# Patient Record
Sex: FEMALE | Race: ASIAN | Employment: FULL TIME | URBAN - METROPOLITAN AREA
[De-identification: names, ages, dates, MRNs, and addresses within clinical notes are randomized per-mention and may not be internally consistent; named-entity substitution may affect disease eponyms.]

---

## 2018-01-04 ENCOUNTER — APPOINTMENT (OUTPATIENT)
Dept: PHYSICAL THERAPY | Age: 53
End: 2018-01-04
Payer: COMMERCIAL

## 2018-01-11 ENCOUNTER — HOSPITAL ENCOUNTER (OUTPATIENT)
Dept: PHYSICAL THERAPY | Age: 53
Discharge: HOME | End: 2018-01-11
Payer: COMMERCIAL

## 2018-01-11 PROCEDURE — 97161 PT EVAL LOW COMPLEX 20 MIN: CPT

## 2018-01-11 PROCEDURE — 97140 MANUAL THERAPY 1/> REGIONS: CPT

## 2018-01-11 PROCEDURE — 97110 THERAPEUTIC EXERCISES: CPT

## 2018-01-11 NOTE — PROGRESS NOTES
PHYSICAL THERAPY - DAILY TREATMENT NOTE    Patient Name: John Duty        Date: 2018  : 1965   YES Patient  Verified  Visit #:     Insurance: Payor: Saleem Montana / Plan: Blanca CABRAL / Product Type: Commerical /      In time: 7:20 A Out time: 8:15 A   Total Treatment Time: 55     Medicare Time Tracking (below)   Total Timed Codes (min):  NA 1:1 Treatment Time:  NA     TREATMENT AREA =  Bunion, right foot [M21.611]    SUBJECTIVE  Pain Level (on 0 to 10 scale):  2  / 10   Medication Changes/New allergies or changes in medical history, any new surgeries or procedures?     NO    If yes, update Summary List   Subjective Functional Status/Changes:  []  No changes reported     See POC          OBJECTIVE  Modalities Rationale:     decrease edema and decrease pain to improve patient's ability to   Return to pain-free amb   min [] Estim, type/location:                                      []  att     []  unatt     []  w/US     []  w/ice    []  w/heat    min []  Mechanical Traction: type/lbs                   []  pro   []  sup   []  int   []  cont    []  before manual    []  after manual    min []  Ultrasound, settings/location:      min []  Iontophoresis w/ dexamethasone, location:                                               []  take home patch       []  in clinic   10 min [x]  Ice     []  Heat    location/position: longsit to R ankle    min []  Vasopneumatic Device, press/temp:     min []  Other:    [] Skin assessment post-treatment (if applicable):    []  intact    []  redness- no adverse reaction     []redness - adverse reaction:        10 min Therapeutic Exercise:  [x]  See flow sheet   Rationale:      increase ROM to improve the patients ability to return to PLOF     10 min Manual Therapy: 1st MTP ext/flex passive stretch with distraction   Rationale:     Improve ROM of 1st MTP to improve gait mechanics with ambulation      min Therapeutic Activity:    Rationale:         min Neuromuscular Re-ed:    Rationale:       min Gait Training:    Rationale:       min Patient Education:  YES  Reviewed HEP   []  Progressed/Changed HEP based on: Other Objective/Functional Measures:    See POC     Post Treatment Pain Level (on 0 to 10) scale:   2  / 10     ASSESSMENT  Assessment/Changes in Function:     See POC     []  See Progress Note/Recertification   Patient will continue to benefit from skilled PT services to instruct in home and community integration to attain remaining goals.    Progress toward goals / Updated goals:    Progressing towards goals established at [address] [facility] [id number]  [x]  Upgrade activities as tolerated YES Continue plan of care   []  Discharge due to :    []  Other:      Therapist: Keyla Baig PT    Date: 1/11/2018 Time: 9:12 AM     Future Appointments  Date Time Provider Ruslan Kang   1/18/2018 7:30 AM Keyla Baig PT [facility] [place]

## 2018-01-11 NOTE — PROGRESS NOTES
[facility] AT [address] [place] [address] Sheeba Plass [address], [address] [place] [address], [address]  Phone: [phone]  Fax: [phone] / [address] [place] [address] PHYSICAL THERAPY SERVICES  Patient Name: Thalia Michelle :    Medical   Diagnosis: Bunion, right foot [M21.611] Treatment Diagnosis: R hallux valgus    Onset Date: 17     Referral Source: Mary Jo Gutierrez MD [facility]): 2018   Prior Hospitalization: See medical history Provider #: [id number]   Prior Level of Function: Manageable sx with ADLs   Comorbidities: H/o bunion on L   Medications: Verified on Patient Summary List   The Plan of Care and following information is based on the information from the initial evaluation.   ==================================================================================  Assessment / key information:  Patient is a 45 y.o. female who presents to [facility] s/p R bunionectomy, hallux valgus on 17. Patient reports she was NWBing on a scooter in a CAM boot x 3 weeks & then progressed to CAM boot WBing with crutches & progressed off of crutches after 6 weeks. She reports DC use of CAM boot ~ 2 weeks into a lace up hiking boot. She reports her pain is very minimal & intermittent in nature with worsening of sx with prolonged weightbearing & after periods of prolonged sitting at her desk (she RTW 17). Sx improve with use of compression garment, ice & elevation. Average reported pain level at 2/10, 8/10 at worst & 0/10 at best.  She has since has 2 sets of X-rays, most recent on 17 with good healing reported. Upon objective evaluation patient demonstrates R 1st MTP passive ext 40 deg, flex 5 deg, R ankle DF 10 deg, PF 50 deg, EV 10 deg, EV 25 deg. MMT: DF 5-/5, PF 2/5, EV 4-/5, IV 4/5. SLS on R at 6 secs, limited by pain.   Upon observation of gait mechanics, pt ambulates with foot flat gait pattern with decreased WBing on R LE. Patient can benefit from PT interventions to improve ROM, strength, gait mechanics & balance and decrease pain, to facilitate ADLs & overall functional status.   ==================================================================================  Eval Complexity: History MEDIUM  Complexity : 1-2 comorbidities / personal factors will impact the outcome/ POC ;  Examination  MEDIUM Complexity : 3 Standardized tests and measures addressing body structure, function, activity limitation and / or participation in recreation ; Presentation LOW Complexity : Stable, uncomplicated ;  Decision Making MEDIUM Complexity : FOTO score of 26-74; Overall Complexity LOW   Problem List: pain affecting function, decrease ROM, decrease strength, edema affecting function, impaired gait/ balance, decrease ADL/ functional abilitiies, decrease activity tolerance, decrease flexibility/ joint mobility, decrease transfer abilities and other FOTO 49   Treatment Plan may include any combination of the following: Therapeutic exercise, Therapeutic activities, Neuromuscular re-education, Physical agent/modality, Gait/balance training, Manual therapy, Aquatic therapy, Patient education, Self Care training, Functional mobility training, Home safety training, Stair training and Other: FOTO 49 points  Patient / Family readiness to learn indicated by: asking questions and trying to perform skills  Persons(s) to be included in education: patient (P)  Barriers to Learning/Limitations: None  Measures taken:    Patient Goal (s): \"increase mobility and return to normal walking\"   Patient self reported health status: excellent  Rehabilitation Potential: excellent   Short Term Goals: To be accomplished in  2  weeks:  1) Establish HEP to prevent further disability. 2) Patient will report decreased c/o pain to < or = 1/10 to facilitate return to pain-free ambulation with manageable sx in R ankle.   3) Improve R 1st MTP ext to > or = 50 deg so ROM is available for normal gait mechanics. 4) Improve FOTO score from 49 points to > or = 59 points indicating improved tolerance with ADLs in regards to return to unlimited ADLs.  Long Term Goals: To be accomplished in  4  weeks:  1) Improve FOTO score from 59 points to > or = 69 points indicating improved tolerance with ADLs in regards to R ankle. 2) Improve R 1st MTP ext > or = 60 deg so ROM is available for normal gait mechanics. 3) Patient will be able to maintain R SLS for 30 seconds indicating improved use of balance strategy for safety with ambulation in challenging environments. 4) Improve overall strength of R ankle to 5-/5 with no c/o pain upon MMT so strength is available for return to PLOF . Frequency / Duration:   Patient to be seen  2-3  times per week for 4  weeks:  Patient / Caregiver education and instruction: self care, activity modification, brace/ splint application and exercises  G-Codes (GP): NA  Therapist Signature: MOHAMUD Lara cert MDT Date: 7/13/8946   Certification Period: NA Time: 7:19 AM   ===========================================================================================  I certify that the above Physical Therapy Services are being furnished while the patient is under my care. I agree with the treatment plan and certify that this therapy is necessary. Physician Signature:        Date:       Time:     Please sign and return to [facility] or you may fax the signed copy to [phone]. Thank you.

## 2018-01-18 ENCOUNTER — APPOINTMENT (OUTPATIENT)
Dept: PHYSICAL THERAPY | Age: 53
End: 2018-01-18
Payer: COMMERCIAL

## 2018-01-25 ENCOUNTER — HOSPITAL ENCOUNTER (OUTPATIENT)
Dept: PHYSICAL THERAPY | Age: 53
Discharge: HOME | End: 2018-01-25
Payer: COMMERCIAL

## 2018-01-25 PROCEDURE — 97140 MANUAL THERAPY 1/> REGIONS: CPT

## 2018-01-25 PROCEDURE — 97110 THERAPEUTIC EXERCISES: CPT

## 2018-01-25 NOTE — PROGRESS NOTES
PHYSICAL THERAPY - DAILY TREATMENT NOTE    Patient Name: Mary Marcus        Date: 2018  : 1965   YES Patient  Verified  Visit #:     Insurance: Payor: Luz Goodwin / Plan: Arland Castleman RPN / Product Type: Commerical /      In time: 5:10 P Out time: 6:12 P   Total Treatment Time: 55     Medicare Time Tracking (below)   Total Timed Codes (min):  NA 1:1 Treatment Time:  NA     TREATMENT AREA =  Hallux valgus, right [M20.11]    SUBJECTIVE  Pain Level (on 0 to 10 scale):  0  / 10   Medication Changes/New allergies or changes in medical history, any new surgeries or procedures? NO    If yes, update Summary List   Subjective Functional Status/Changes:  []  No changes reported     Patient had f/u with MD on Tuesday & they took X-rays & everything looked good. She reports she \"feels her hardware\" at times, this is not painful but she is aware of it more. F/u with MD prn.          OBJECTIVE  Modalities Rationale:     decrease edema, decrease inflammation and decrease pain to improve patient's ability to return to pain-free standing    min [] Estim, type/location:                                      []  att     []  unatt     []  w/US     []  w/ice    []  w/heat    min []  Mechanical Traction: type/lbs                   []  pro   []  sup   []  int   []  cont    []  before manual    []  after manual    min []  Ultrasound, settings/location:      min []  Iontophoresis w/ dexamethasone, location:                                               []  take home patch       []  in clinic   5 min [x]  Ice     []  Heat    location/position: longsit to R ankle    min []  Vasopneumatic Device, press/temp:     min []  Other:    [] Skin assessment post-treatment (if applicable):    []  intact    []  redness- no adverse reaction     []redness - adverse reaction:        35 min Therapeutic Exercise:  [x]  See flow sheet   Rationale:      increase ROM and increase strength to improve the patients ability to return to pain-free standing      15 min Manual Therapy:    Rationale:      decrease pain and increase ROM to improve patient's ability to return to amb with normalized gait mechanics. min Therapeutic Activity:    Rationale:      min Neuromuscular Re-ed:    Rationale:        min Gait Training:    Rationale:       min Patient Education:  YES  Reviewed HEP   []  Progressed/Changed HEP based on: Other Objective/Functional Measures: Added TE per flow sheet      Post Treatment Pain Level (on 0 to 10) scale:   0  / 10     ASSESSMENT  Assessment/Changes in Function:   Good tolerance to initial program      []  See Progress Note/Recertification   Patient will continue to benefit from skilled PT services to modify and progress therapeutic interventions, address functional mobility deficits, address ROM deficits, address strength deficits, analyze and modify body mechanics/ergonomics, assess and modify postural abnormalities, address imbalance/dizziness and instruct in home and community integration to attain remaining goals.    Progress toward goals / Updated goals:    Progressing towards goals established at [place]  [x]  Upgrade activities as tolerated YES Continue plan of care   []  Discharge due to :    []  Other:      Therapist: Zandra Padron PT    Date: 1/25/2018 Time: 4:58 PM     Future Appointments  Date Time Provider Ruslan Kang   1/25/2018 5:00 PM Zandra Padron, [facility] [place]

## 2018-02-01 ENCOUNTER — HOSPITAL ENCOUNTER (OUTPATIENT)
Dept: PHYSICAL THERAPY | Age: 53
Discharge: HOME | End: 2018-02-01
Payer: COMMERCIAL

## 2018-02-01 PROCEDURE — 97140 MANUAL THERAPY 1/> REGIONS: CPT

## 2018-02-01 PROCEDURE — 97110 THERAPEUTIC EXERCISES: CPT

## 2018-02-01 NOTE — PROGRESS NOTES
PHYSICAL THERAPY - DAILY TREATMENT NOTE    Patient Name: Chanelle Lloyd        Date: 2018  : 1965   YES Patient  Verified  Visit #:   3   of   12  Insurance: Payor: Lex Perez / Plan: Aggie CABRAL / Product Type: Commerical /      In time: 5 P Out time: 6:15 P   Total Treatment Time: 70     Medicare Time Tracking (below)   Total Timed Codes (min):  NA 1:1 Treatment Time:  NA     TREATMENT AREA =  Hallux valgus, right [M20.11]    SUBJECTIVE  Pain Level (on 0 to 10 scale):  0  / 10   Medication Changes/New allergies or changes in medical history, any new surgeries or procedures? NO    If yes, update Summary List   Subjective Functional Status/Changes:  []  No changes reported     Patient reports that she is doing her HEP as directed, she can feel her hardware more now when she walks.            OBJECTIVE  Modalities Rationale:     decrease pain to improve patient's ability to return to pain-free amb   min [] Estim, type/location:                                      []  att     []  unatt     []  w/US     []  w/ice    []  w/heat    min []  Mechanical Traction: type/lbs                   []  pro   []  sup   []  int   []  cont    []  before manual    []  after manual    min []  Ultrasound, settings/location:      min []  Iontophoresis w/ dexamethasone, location:                                               []  take home patch       []  in clinic   10 min [x]  Ice     []  Heat    location/position: longsit to R ankle    min []  Vasopneumatic Device, press/temp:     min []  Other:    [] Skin assessment post-treatment (if applicable):    []  intact    []  redness- no adverse reaction     []redness - adverse reaction:        40 min Therapeutic Exercise:  [x]  See flow sheet   Rationale:      increase ROM and increase strength to improve the patients ability to return to PLOF     20 min Manual Therapy: Manual stretch to 1st & 2nd MTP into ext, AP/PA mobs to 1st & 2nd MTP   Rationale:      increase ROM to improve patient's ability to tolerate normalized gait      min Therapeutic Activity:    Rationale:      min Neuromuscular Re-ed:    Rationale:        min Gait Training:    Rationale:       min Patient Education:  YES  Reviewed HEP   []  Progressed/Changed HEP based on: Other Objective/Functional Measures: Added mini squats, HR/TR in standing, 1st MTP stretch with Tband      Post Treatment Pain Level (on 0 to 10) scale:   0  / 10     ASSESSMENT  Assessment/Changes in Function:   Good tolerance to today's progressions, pt reported improved ease with performance of TE after MT today vs last PT visit. []  See Progress Note/Recertification   Patient will continue to benefit from skilled PT services to modify and progress therapeutic interventions, address functional mobility deficits, address ROM deficits, address strength deficits, assess and modify postural abnormalities, address imbalance/dizziness and instruct in home and community integration to attain remaining goals.    Progress toward goals / Updated goals:    Progressing towards STG  3, 4, STG 1 & 2 met      PLAN  [x]  Upgrade activities as tolerated YES Continue plan of care   []  Discharge due to :    []  Other:      Therapist: Ada Martinez PT    Date: 2/1/2018 Time: 6:41 PM     Future Appointments  Date Time Provider Ruslan Kang   2/8/2018 5:00 PM Crystal [place] [facility]   2/15/2018 5:00 PM Ada Martinez PT [facility]

## 2018-02-08 ENCOUNTER — HOSPITAL ENCOUNTER (OUTPATIENT)
Dept: PHYSICAL THERAPY | Age: 53
Discharge: HOME | End: 2018-02-08
Payer: COMMERCIAL

## 2018-02-08 PROCEDURE — 97110 THERAPEUTIC EXERCISES: CPT

## 2018-02-08 PROCEDURE — 97140 MANUAL THERAPY 1/> REGIONS: CPT

## 2018-02-08 NOTE — PROGRESS NOTES
[facility] AT [address] [place] [address] [facility], [address] [place] [address], [address]  Phone: [phone]  Fax: [phone]  PROGRESS NOTE  Patient Name: Opal Cooper :    Treatment/Medical Diagnosis: Hallux valgus, right [M20.11]   Referral Source: Boris Chen MD     Date of Initial Visit: 17 Attended Visits: 4 Missed Visits: 0     SUMMARY OF TREATMENT  Therapeutic exercise including ROM, stretching, gentle strengthening, gait & balance training, postural ed, patient education, HEP instruction, MHP, CP, man. CURRENT STATUS  Patient reports intermittent c/o pain in R ankle with primary c/o \"tightness & soreness\" in R foot/ankle, she reports 5-10 mins walking tolerance although her gait mechanics have improved. Please see below for other improvements with PT. Goal/Measure of Progress Goal Met? 1.  Establish HEP to prevent further disability. Status at last Eval: NA Current Status: HEP established  yes   2. Patient will report decreased c/o pain to < or = 1/10 to facilitate return to pain-free ambulation with manageable sx in R ankle. Status at last Eval: Average 2/10  At worst 8/10  At best 0/10  Current Status: Average 2-3/10  At worst 4/10  At best 0/10 yes   3. Improve R 1st MTP ext to > or = 50 deg so ROM is available for normal gait mechanics. Status at last Eval: R 1st MTP ext 40 deg  1st flex 5 deg Current Status: 1st MTP ext 65 deg  1st flex 20 deg yes   4. Improve FOTO score from 49 points to > or = 59 points indicating improved tolerance with ADLs in regards to return to unlimited ADLs. Status at last Eval: 52 Current Status: TBA n/a     New Goals to be achieved in __3-4__  weeks:  1. Improve FOTO score from 49 points to > or = 59 points indicating improved tolerance with ADLs in regards to return to unlimited ADLs.    2.  Improve walking tolerance to > o r= 15-20 mins so pt able to resume recreational fitness program.   3.  Improve overall strength of R ankle to 5-/5 with no c/o pain upon MMT so strength is available for return to PLOF . 4. Patient will be able to maintain R SLS for 30 seconds indicating improved use of balance strategy for safety with ambulation in challenging environments. G-Codes (GP): NA  RECOMMENDATIONS  Patient to continue with PT for up to 3-4 more weeks in order to progress towards achieving all LTGs. If you have any questions/comments please contact us directly at [phone]. Thank you for allowing us to assist in the care of your patient. Therapist Signature: MOHAMUD Corey, cert MDT Date: 1/8/0946     Time: 5:10 PM   NOTE TO PHYSICIAN:  PLEASE COMPLETE THE ORDERS BELOW AND FAX TO   [facility]: [phone]. If you are unable to process this request in 24 hours please contact our office: [phone].    ___ I have read the above report and request that my patient continue as recommended.   ___ I have read the above report and request that my patient continue therapy with the following changes/special instructions:_________________________________________________________   ___ I have read the above report and request that my patient be discharged from therapy.      Physician Signature:        Date:       Time:

## 2018-02-08 NOTE — PROGRESS NOTES
PHYSICAL THERAPY - DAILY TREATMENT NOTE    Patient Name: Dane Officer        Date: 2018  : 1965   YES Patient  Verified  Visit #:     Insurance: Payor: Cornelia Soto / Plan: Tonia CABRAL / Product Type: Commerical /      In time: 5:00 P Out time: 6:00 P   Total Treatment Time: 55     Medicare Time Tracking (below)   Total Timed Codes (min):  NA 1:1 Treatment Time:  NA     TREATMENT AREA =  Hallux valgus, right [M20.11]    SUBJECTIVE  Pain Level (on 0 to 10 scale):  0  / 10   Medication Changes/New allergies or changes in medical history, any new surgeries or procedures?     NO    If yes, update Summary List   Subjective Functional Status/Changes:  []  No changes reported     See PN           OBJECTIVE  Modalities Rationale:     decrease pain to improve patient's ability to return to pain-free amb   min [] Estim, type/location:                                      []  att     []  unatt     []  w/US     []  w/ice    []  w/heat    min []  Mechanical Traction: type/lbs                   []  pro   []  sup   []  int   []  cont    []  before manual    []  after manual    min []  Ultrasound, settings/location:      min []  Iontophoresis w/ dexamethasone, location:                                               []  take home patch       []  in clinic   10 min [x]  Ice     []  Heat    location/position: R foot/ankle     min []  Vasopneumatic Device, press/temp:     min []  Other:    [] Skin assessment post-treatment (if applicable):    []  intact    []  redness- no adverse reaction     []redness - adverse reaction:        30 min Therapeutic Exercise:  [x]  See flow sheet   Rationale:      increase ROM, increase strength, improve balance and increase proprioception to improve the patients ability to return to prolonged standing     15 min Manual Therapy: Manual stretch to 1st & 2nd MTP into ext, AP/PA mobs to 1st & 2nd MTP   Rationale:      decrease pain and increase ROM to improve patient's ability to return to pain-free ambulation      min Therapeutic Activity:    Rationale:      min Neuromuscular Re-ed:    Rationale:        min Gait Training:    Rationale:       min Patient Education:  YES  Reviewed HEP   []  Progressed/Changed HEP based on: Other Objective/Functional Measures:  01st MTP flex/ext (see PN)     Post Treatment Pain Level (on 0 to 10) scale:   0  / 10     ASSESSMENT  Assessment/Changes in Function:   Good progress with ROM/strength & gait mechanics      []  See Progress Note/Recertification   Patient will continue to benefit from skilled PT services to modify and progress therapeutic interventions, address functional mobility deficits, address ROM deficits, address strength deficits, assess and modify postural abnormalities, address imbalance/dizziness and instruct in home and community integration to attain remaining goals.    Progress toward goals / Updated goals:  Met STG 1, 2, 3 progressing towards STG 4     PLAN  [x]  Upgrade activities as tolerated YES Continue plan of care   []  Discharge due to :    [x]  Other: PN to MD     Therapist: Alexandro Padgett PT    Date: 2/8/2018 Time: 5:09 PM     Future Appointments  Date Time Provider Ruslan Kang   2/15/2018 5:00 PM Alexandro Padgett PT [facility] [place]

## 2018-02-15 ENCOUNTER — HOSPITAL ENCOUNTER (OUTPATIENT)
Dept: PHYSICAL THERAPY | Age: 53
Discharge: HOME | End: 2018-02-15
Payer: COMMERCIAL

## 2018-02-15 PROCEDURE — 97140 MANUAL THERAPY 1/> REGIONS: CPT

## 2018-02-15 PROCEDURE — 97110 THERAPEUTIC EXERCISES: CPT

## 2018-02-15 NOTE — PROGRESS NOTES
PHYSICAL THERAPY - DAILY TREATMENT NOTE    Patient Name: Ken Clark        Date: 2/15/2018  : 1965   YES Patient  Verified  Visit #:      of   12  Insurance: Payor: Reese Pulido / Plan: Ritu Lay RPN / Product Type: Commerical /      In time: 5:05 P Out time: 6:08 P   Total Treatment Time: 60     Medicare Time Tracking (below)   Total Timed Codes (min):  NA 1:1 Treatment Time:  NA     TREATMENT AREA =  Hallux valgus, right [M20.11]    SUBJECTIVE  Pain Level (on 0 to 10 scale):  0  / 10   Medication Changes/New allergies or changes in medical history, any new surgeries or procedures?     NO    If yes, update Summary List   Subjective Functional Status/Changes:  []  No changes reported     Patient reports f/u with MD went well, he said that what she feels in her hardware is fairly normal.          OBJECTIVE  Modalities Rationale:     decrease edema and decrease pain to improve patient's ability to return to pain-free ambulation    min [] Estim, type/location:                                      []  att     []  unatt     []  w/US     []  w/ice    []  w/heat    min []  Mechanical Traction: type/lbs                   []  pro   []  sup   []  int   []  cont    []  before manual    []  after manual    min []  Ultrasound, settings/location:      min []  Iontophoresis w/ dexamethasone, location:                                               []  take home patch       []  in clinic   10 min [x]  Ice     []  Heat    location/position: Supine to R foot    min []  Vasopneumatic Device, press/temp:     min []  Other:    [] Skin assessment post-treatment (if applicable):    []  intact    []  redness- no adverse reaction     []redness - adverse reaction:        35 min Therapeutic Exercise:  [x]  See flow sheet   Rationale:      increase ROM and increase strength to improve the patients ability to return to PLOF     15 min Manual Therapy: Manual stretch to 1st & 2nd MTP into ext, AP/PA mobs to 1st & 2nd MTP   Rationale: decrease pain and increase ROM to improve patient's ability to return to pain-free ambulation      min Therapeutic Activity:    Rationale:      min Neuromuscular Re-ed:    Rationale:        min Gait Training:    Rationale:       min Patient Education:  YES  Reviewed HEP   []  Progressed/Changed HEP based on: Other Objective/Functional Measures:    Progressed SLS to blue disc   Post Treatment Pain Level (on 0 to 10) scale:   0  / 10     ASSESSMENT  Assessment/Changes in Function:   Mod sensitivity along plantar aspect of foot with SLS with WBing without shoe today    []  See Progress Note/Recertification   Patient will continue to benefit from skilled PT services to modify and progress therapeutic interventions, address functional mobility deficits, address ROM deficits, address strength deficits, address imbalance/dizziness and instruct in home and community integration to attain remaining goals.    Progress toward goals / Updated goals:    Progressing towards newly established LTGs     PLAN  [x]  Upgrade activities as tolerated YES Continue plan of care   []  Discharge due to :    []  Other:      Therapist: Anil Abernathy PT    Date: 2/15/2018 Time: 5:08 PM     Future Appointments  Date Time Provider Ruslan Kang   3/1/2018 5:00 PM Vito Light [facility]   3/8/2018 5:00 PM Anil Abernathy, PT [facility]   3/15/2018 5:00 PM Anil Abernathy PT [facility]   3/22/2018 5:00 PM Anil Abernathy PT [facility]

## 2018-02-20 ENCOUNTER — HOSPITAL ENCOUNTER (OUTPATIENT)
Dept: PHYSICAL THERAPY | Age: 53
Discharge: HOME | End: 2018-02-20
Payer: COMMERCIAL

## 2018-02-20 PROCEDURE — 97140 MANUAL THERAPY 1/> REGIONS: CPT

## 2018-02-20 PROCEDURE — 97110 THERAPEUTIC EXERCISES: CPT

## 2018-02-20 NOTE — PROGRESS NOTES
PHYSICAL THERAPY - DAILY TREATMENT NOTE    Patient Name: Ivan Canela        Date: 2018  : 1965   YES Patient  Verified  Visit #:     Insurance: Payor: Zhen Arguello / Plan: Starla Gilbert RPN / Product Type: Commerical /      In time: 5:10 P Out time: 6:20 P   Total Treatment Time: 65     Medicare Time Tracking (below)   Total Timed Codes (min):  NA 1:1 Treatment Time:  NA     TREATMENT AREA =  Hallux valgus, right [M20.11]    SUBJECTIVE  Pain Level (on 0 to 10 scale):  2  / 10   Medication Changes/New allergies or changes in medical history, any new surgeries or procedures? NO    If yes, update Summary List   Subjective Functional Status/Changes:  []  No changes reported     Patient reports she has been focusing on her walking & taking the time to make a full step.            OBJECTIVE  Modalities Rationale:     decrease pain to improve patient's ability to return to pain-free standing   min [] Estim, type/location:                                      []  att     []  unatt     []  w/US     []  w/ice    []  w/heat    min []  Mechanical Traction: type/lbs                   []  pro   []  sup   []  int   []  cont    []  before manual    []  after manual    min []  Ultrasound, settings/location:      min []  Iontophoresis w/ dexamethasone, location:                                               []  take home patch       []  in clinic   10 min [x]  Ice     []  Heat    location/position: Longsit to R ankle    min []  Vasopneumatic Device, press/temp:     min []  Other:    [] Skin assessment post-treatment (if applicable):    []  intact    []  redness- no adverse reaction     []redness - adverse reaction:        35 min Therapeutic Exercise:  [x]  See flow sheet   Rationale:      increase ROM and increase strength to improve the patients ability to return to indep amb     20 min Manual Therapy: Manual stretch to 1st & 2nd MTP into ext, AP/PA mobs to 1st & 2nd MTP   Rationale:      decrease pain, increase ROM, increase tissue extensibility and increase postural awareness to improve patient's ability to return to amb with normalized gait mechanics     min Therapeutic Activity:    Rationale:      min Neuromuscular Re-ed:    Rationale:        min Gait Training:    Rationale:       min Patient Education:  YES  Reviewed HEP   []  Progressed/Changed HEP based on: Other Objective/Functional Measures:    TE per flow sheet, added step ups on BOSU     Post Treatment Pain Level (on 0 to 10) scale:   0  / 10     ASSESSMENT  Assessment/Changes in Function:   Good tolerance to today's strength progressions      []  See Progress Note/Recertification   Patient will continue to benefit from skilled PT services to modify and progress therapeutic interventions, address functional mobility deficits, address ROM deficits, address strength deficits, analyze and cue movement patterns, analyze and modify body mechanics/ergonomics, assess and modify postural abnormalities and instruct in home and community integration to attain remaining goals.    Progress toward goals / Updated goals:    Progressing towards LTG 1, 2     PLAN  [x]  Upgrade activities as tolerated YES Continue plan of care   []  Discharge due to :    []  Other:      Therapist: Alexandro Padgett PT    Date: 2/20/2018 Time: 6:27 PM     Future Appointments  Date Time Provider Ruslan Kang   3/1/2018 5:00 PM Jeremy Tipton [facility]   3/8/2018 5:00 PM Alexandro Padgett PT [facility]   3/15/2018 5:00 PM Alexandro Padgett PT [facility]   3/22/2018 5:00 PM Alexandro Padgett PT [facility]

## 2018-02-21 ENCOUNTER — APPOINTMENT (OUTPATIENT)
Dept: PHYSICAL THERAPY | Age: 53
End: 2018-02-21
Payer: COMMERCIAL

## 2018-03-01 ENCOUNTER — HOSPITAL ENCOUNTER (OUTPATIENT)
Dept: PHYSICAL THERAPY | Age: 53
Discharge: HOME | End: 2018-03-01
Payer: COMMERCIAL

## 2018-03-01 PROCEDURE — 97110 THERAPEUTIC EXERCISES: CPT

## 2018-03-01 PROCEDURE — 97140 MANUAL THERAPY 1/> REGIONS: CPT

## 2018-03-01 NOTE — PROGRESS NOTES
PHYSICAL THERAPY - DAILY TREATMENT NOTE    Patient Name: Ml Salinas        Date: 3/1/2018  : 1965   YES Patient  Verified  Visit #:     Insurance: Payor: Terra Mir / Plan: Kelley Rosenthal RPN / Product Type: Commerical /      In time: 5 P Out time: 6:10 P   Total Treatment Time: 65     Medicare Time Tracking (below)   Total Timed Codes (min):  NA 1:1 Treatment Time:  NA     TREATMENT AREA =  Hallux valgus, right [M20.11]    SUBJECTIVE  Pain Level (on 0 to 10 scale):  0  / 10   Medication Changes/New allergies or changes in medical history, any new surgeries or procedures?     NO    If yes, update Summary List   Subjective Functional Status/Changes:  []  No changes reported     Patient reports that she still has sensitivity along the bottom of her second toe          OBJECTIVE  Modalities Rationale:     decrease edema and decrease pain to improve patient's ability to return to pain-free standing   min [] Estim, type/location:                                      []  att     []  unatt     []  w/US     []  w/ice    []  w/heat    min []  Mechanical Traction: type/lbs                   []  pro   []  sup   []  int   []  cont    []  before manual    []  after manual    min []  Ultrasound, settings/location:      min []  Iontophoresis w/ dexamethasone, location:                                               []  take home patch       []  in clinic   10 min [x]  Ice     []  Heat    location/position: Supine to R knee     min []  Vasopneumatic Device, press/temp:     min []  Other:    [] Skin assessment post-treatment (if applicable):    []  intact    []  redness- no adverse reaction     []redness - adverse reaction:        35 min Therapeutic Exercise:  [x]  See flow sheet   Rationale:      increase ROM, improve coordination, improve balance and increase proprioception to improve the patients ability to return to amb without signs of antalgia     20 min Manual Therapy: Manual stretch to 1st & 2nd MTP into ext, AP/PA mobs to 1st & 2nd MTP   Rationale:      decrease pain and increase ROM to improve patient's ability to return to amb with normalized gait pattern     min Therapeutic Activity:    Rationale:      min Neuromuscular Re-ed:    Rationale:        min Gait Training:    Rationale:       min Patient Education:  YES  Reviewed HEP   []  Progressed/Changed HEP based on: Other Objective/Functional Measures:    Progressed to B HR (lowering on R LE)     Post Treatment Pain Level (on 0 to 10) scale:   0  / 10     ASSESSMENT  Assessment/Changes in Function:   Good tolerance to progression of weighted 1st MTP ext     []  See Progress Note/Recertification   Patient will continue to benefit from skilled PT services to modify and progress therapeutic interventions, address functional mobility deficits, address ROM deficits, assess and modify postural abnormalities, address imbalance/dizziness and instruct in home and community integration to attain remaining goals.    Progress toward goals / Updated goals:    Progressing towards LTGs     PLAN  [x]  Upgrade activities as tolerated YES Continue plan of care   []  Discharge due to :    [x]  Other: Re-assess n/v     Therapist: Alexandro Padgett PT    Date: 3/1/2018 Time: 6:32 PM     Future Appointments  Date Time Provider Ruslan Kang   3/8/2018 5:00 PM Jeremy Tipton [facility]   3/15/2018 5:00 PM Alexandro Padgett PT [facility]   3/22/2018 5:00 PM Alexandro Padgett PT [facility]

## 2018-03-08 ENCOUNTER — HOSPITAL ENCOUNTER (OUTPATIENT)
Dept: PHYSICAL THERAPY | Age: 53
Discharge: HOME | End: 2018-03-08
Payer: COMMERCIAL

## 2018-03-08 PROCEDURE — 97140 MANUAL THERAPY 1/> REGIONS: CPT

## 2018-03-08 PROCEDURE — 97110 THERAPEUTIC EXERCISES: CPT

## 2018-03-08 NOTE — PROGRESS NOTES
[address] [place] [address] PHYSICAL THERAPY AT [address] [place] [address]  [address]on[address] Aly [address], [address] ,[address], [address]  Phone: [phone]  Fax: [phone] SUMMARY  Patient Name: Juanis Rodríguez :    Treatment/Medical Diagnosis: Hallux valgus, right [M20.11]   Referral Source: Janine Dennis MD     Date of Initial Visit: 18 Attended Visits: 8 Missed Visits: 0     SUMMARY OF TREATMENT  Therapeutic exercise including ROM, stretching, gentle strengthening, gait & balance training, postural ed, patient education, HEP instruction, manual therapy, CP. CURRENT STATUS  Mrs. Renato Lewis has made good progress with PT & generally reports no c/o pain in R ankle with primary c/o stiffness & tightness in R foot/ankle. Good passive mobility of 1st MTP with limited mobility of 2nd MTP with c/o pain with flex>ext passively as well as TTP along the dorsum of 2nd MTP. Good mechanics noted with ambulation with improved push off noted with 1st MTP during terminal stance prior to swing phase on R LE. She reports 65% improvement overall, is pleased with her progress & feels ready for DC to home program to maintain current level of function. Goal/Measure of Progress Goal Met? 1. Improve FOTO score from 49 points to > or = 59 points indicating improved tolerance with ADLs in regards to return to unlimited ADLs. Status at last Eval: 49 Current Status: 50 Partially met    2. Improve walking tolerance to > or= 15-20 mins so pt able to resume recreational fitness program.   Status at last Eval: limited Current Status: Pt able to tolerate WBing up to 1 hour on R LE yes   3. Improve overall strength of R ankle to 5-/5 with no c/o pain upon MMT so strength is available for return to PLOF . Status at last Eval: DF 5-/5, PF 2/5, EV 4-/5  IV 4/5 Current Status: DF 5/5,  PF 4/5, EV 5/5  IV 5/5 Partially met   4.   Patient will be able to maintain R SLS for 30 seconds indicating improved use of balance strategy for safety with ambulation in challenging environments.     Status at last Eval: unable Current Status: R SLS 30 sec on noncompliant surface yes     RECOMMENDATIONS  Discontinue therapy. Progressing towards or have reached established goals. If you have any questions/comments please contact us directly at [phone]. Thank you for allowing us to assist in the care of your patient.     Therapist Signature: MOHAMUD Spann, cert MDT Date: 9-86-83     Time: 6:37 PM

## 2018-03-08 NOTE — PROGRESS NOTES
PHYSICAL THERAPY - DAILY TREATMENT NOTE    Patient Name: Larissa Santo        Date: 3/8/2018  : 1965   YES Patient  Verified  Visit #:     Insurance: Payor: Benedicto Powell / Plan: Desiree Regalado RPN / Product Type: Commerical /      In time: 5:10 P Out time: 6:05 P   Total Treatment Time: 50     Medicare Time Tracking (below)   Total Timed Codes (min):  NA 1:1 Treatment Time:  NA     TREATMENT AREA =  Hallux valgus, right [M20.11]    SUBJECTIVE  Pain Level (on 0 to 10 scale):  0  / 10   Medication Changes/New allergies or changes in medical history, any new surgeries or procedures?     NO    If yes, update Summary List   Subjective Functional Status/Changes:  []  No changes reported     See DC summary           OBJECTIVE  Modalities Rationale:     decrease pain to improve patient's ability to return to pain-free amb   min [] Estim, type/location:                                      []  att     []  unatt     []  w/US     []  w/ice    []  w/heat    min []  Mechanical Traction: type/lbs                   []  pro   []  sup   []  int   []  cont    []  before manual    []  after manual    min []  Ultrasound, settings/location:      min []  Iontophoresis w/ dexamethasone, location:                                               []  take home patch       []  in clinic   10 min [x]  Ice     []  Heat    location/position: Supine to R ankle    min []  Vasopneumatic Device, press/temp:     min []  Other:    [] Skin assessment post-treatment (if applicable):    []  intact    []  redness- no adverse reaction     []redness - adverse reaction:        15 min Therapeutic Exercise:  [x]  See flow sheet   Rationale:      increase ROM and increase strength to improve the patients ability to return to pain-free standing     25 min Manual Therapy: Manual stretch to 1st & 2nd MTP into ext, AP/PA mobs to 1st & 2nd MTP   Rationale:     Improve mobility to normalize gait mechanics     min Therapeutic Activity:    Rationale: min Neuromuscular Re-ed:    Rationale:       min Gait Training:    Rationale:       min Patient Education:  YES  Reviewed HEP   []  Progressed/Changed HEP based on: Other Objective/Functional Measures:    FOTO 50  R ankle strength 5/5 no c/o pain upon MMT     Post Treatment Pain Level (on 0 to 10) scale:   0  / 10     ASSESSMENT  Assessment/Changes in Function:     Pt in agreement with DC, indep with program      []  See Progress Note/Recertification   Patient will continue to benefit from skilled PT services to instruct in home and community integration to attain remaining goals.    Progress toward goals / Updated goals:    Progressing towards LTGs     PLAN  []  Upgrade activities as tolerated NO Continue plan of care   [x]  Discharge due to : Goals partially met   []  Other:      Therapist: Marylu Murray, PT    Date: 3/8/2018 Time: 5:23 PM     Future Appointments  Date Time Provider Ruslan Kang   3/8/2018 5:30 PM Miguel Becerra [facility]   3/15/2018 5:00 PM Marylu Murray, PRABHA [facility]   3/22/2018 5:00 PM Marylu Murray PT [facility]

## 2018-03-15 ENCOUNTER — APPOINTMENT (OUTPATIENT)
Dept: PHYSICAL THERAPY | Age: 53
End: 2018-03-15
Payer: COMMERCIAL

## 2018-03-22 ENCOUNTER — APPOINTMENT (OUTPATIENT)
Dept: PHYSICAL THERAPY | Age: 53
End: 2018-03-22
Payer: COMMERCIAL

## 2019-03-04 ENCOUNTER — HOSPITAL ENCOUNTER (OUTPATIENT)
Dept: PHYSICAL THERAPY | Age: 54
Discharge: HOME | End: 2019-03-04
Payer: COMMERCIAL

## 2019-03-04 PROCEDURE — 97161 PT EVAL LOW COMPLEX 20 MIN: CPT

## 2019-03-04 PROCEDURE — 97110 THERAPEUTIC EXERCISES: CPT

## 2019-03-04 NOTE — PROGRESS NOTES
[facility] AT [address] [place] [address] [address], [address] [place] [address], [address]  Phone: [phone]  Fax: [phone] / [address] [address] PHYSICAL THERAPY SERVICES  Patient Name: Nicola Luciano :    Medical   Diagnosis: Pain in right shoulder [M25.511] Treatment Diagnosis: R shoulder pain   Onset Date: 2019     Referral Source: Sylvia Driver MD [facility] 3/4/2019   Prior Hospitalization: See medical history Provider #: [id number]   Prior Level of Function: Manage   Comorbidities: None   Medications: Verified on Patient Summary List   The Plan of Care and following information is based on the information from the initial evaluation.   ==================================================================================  Assessment / key information:  Patient is a pleasant 52 y.o. female who presents to [facility] with R shoulder pain. Patient reports chronic h/o neck pain & H/A which have improved although she reports initial onset of sx in R shoulder in Feb which were of unknown etiology. Current c/o pain are currently intermittent in nature & worsen with activities such as reaching overhead, reaching behind her to dress, reaching out to the side, & with lifting such as holding her purse. Sx improve with avoidance of these activities, rest & taking alleve prn. Average reported pain level at 3/10, 6/10 at worst & 0/10 at best.  She had an X-ray on 19 which was unremarkable & she also reports reduction of sx after steroid injection at f/u with MD. Upon objective evaluation, patient demonstrates R shoulder AROM that is grossly [place] [place] with mild ERP throughout all planes of motion as well as ERP with passive OP to ER today. MMT revealed overall strength at 4-/5 with c/o pain upon MMT of flex, ABD & ER>IR today.   Impingement tests were (+) for sx reproduction & mod TTP along SS tendon today. Patient can benefit PT interventions to improve posture & strength, decrease pain & restore mechanics of shoulder to facilitate return to unlimited ADLs, work activities & overall functional status.   ==================================================================================  Eval Complexity: History LOW Complexity : Zero comorbidities / personal factors that will impact the outcome / POC;  Examination  MEDIUM Complexity : 3 Standardized tests and measures addressing body structure, function, activity limitation and / or participation in recreation ; Presentation LOW Complexity : Stable, uncomplicated ;  Decision Making MEDIUM Complexity : FOTO score of 26-74; Overall Complexity LOW   Problem List: pain affecting function, decrease ROM, decrease strength, edema affecting function, decrease ADL/ functional abilitiies, decrease transfer abilities and other FOTO 41 points   Treatment Plan may include any combination of the following: Therapeutic exercise, Therapeutic activities, Neuromuscular re-education, Physical agent/modality, Gait/balance training, Manual therapy, Aquatic therapy, Patient education, Self Care training, Functional mobility training and Home safety training  Patient / Family readiness to learn indicated by: asking questions, trying to perform skills and interest  Persons(s) to be included in education: patient (P)  Barriers to Learning/Limitations: None  Measures taken:    Patient Goal (s): \"reduce, eliminate pain\"   Patient self reported health status: good  Rehabilitation Potential: good   Short Term Goals: To be accomplished in  2  weeks:  1) Establish HEP to prevent further disability. 2) Patient will report decreased c/o pain to < or = 3-4/10 at worst to facilitate return to overhead reaching with manageable sx in R shoulder. 3) Improve FOTO score from 41 points to > or = 50 points indicating improved tolerance with ADLs in regards to R shoulder.  Long Term Goals:  To be accomplished in  4  weeks:  1) Improve FOTO score from 50 points to > or = 66 points indicating improved tolerance with ADLs in regards to R shoulder. 2) Patient to report 75% improvement in overall function in preparation for return to recreational activities with manageable sx in R shoulder. 3) Improve overall strength of R shoulder to 5-/5 so strength is available for return to light lifting activities at work/home. 4) Improve R shoulder AROM for all planes of motion to [facility] so ROM is available for dressing activities and/or overhead reaching. Frequency / Duration:   Patient to be seen  2-3  times per week for 4  weeks:  Patient / Caregiver education and instruction: self care, activity modification, brace/ splint application and exercises    Therapist Signature: MOHAMUD Lubin, cert MDT Date: 5/3/5695   Certification Period: None Time: 9:18 AM   ==================================================================================  I certify that the above Physical Therapy Services are being furnished while the patient is under my care. I agree with the treatment plan and certify that this therapy is necessary. Physician Signature:        Date:       Time:     Please sign and return to [facility] or you may fax the signed copy to [phone]. Thank you.

## 2019-03-04 NOTE — PROGRESS NOTES
PHYSICAL THERAPY - DAILY TREATMENT NOTE    Patient Name: Sukhdeep Fuller        Date: 3/4/2019  : 1965   YES Patient  Verified  Visit #:      of   12  Insurance: Payor: Lilian Parrish / Plan: Aris Valle RPN / Product Type: Commerical /      In time: 9:15 A Out time: 10 A   Total Treatment Time: 45     BCBS/Medicare Time Tracking (below)   Total Timed Codes (min):  NA 1:1 Treatment Time:  NA     TREATMENT AREA = Pain in right shoulder [M25.511]    SUBJECTIVE  Pain Level (on 0 to 10 scale):  0  / 10   Medication Changes/New allergies or changes in medical history, any new surgeries or procedures?     NO    If yes, update Summary List   Subjective Functional Status/Changes:  []  No changes reported     See POC           Modalities Rationale:    PD   min [] Estim, type/location:                                      []  att     []  unatt     []  w/US     []  w/ice    []  w/heat    min []  Mechanical Traction: type/lbs                   []  pro   []  sup   []  int   []  cont    []  before manual    []  after manual    min []  Ultrasound, settings/location:      min []  Iontophoresis w/ dexamethasone, location:                                               []  take home patch       []  in clinic    min []  Ice     []  Heat    location/position:     min []  Vasopneumatic Device, press/temp:     min []  Other:    [] Skin assessment post-treatment (if applicable):    []  intact    []  redness- no adverse reaction     []redness - adverse reaction:        10 min Therapeutic Exercise:  [x]  See flow sheet   Rationale:      increase ROM and increase strength to improve the patients ability to return to pain-free dressing     Billed With/As:   [] TE   [] TA   [] Neuro   [] Self Care Patient Education: [x] Review HEP    [] Progressed/Changed HEP based on:   [] positioning   [] body mechanics   [] transfers   [] heat/ice application    [] other:      Other Objective/Functional Measures:    See POC     Post Treatment Pain Level (on 0 to 10) scale:   0  / 10     ASSESSMENT  Assessment/Changes in Function:     See POC     []  See Progress Note/Recertification   Patient will continue to benefit from skilled PT services to modify and progress therapeutic interventions, address functional mobility deficits, address ROM deficits, assess and modify postural abnormalities and instruct in home and community integration to attain remaining goals.    Progress toward goals / Updated goals:    Progressing towards goals established at [address] [facility] [address]  []  Upgrade activities as tolerated YES Continue plan of care   []  Discharge due to :    []  Other:      Therapist: Massimo Degroot PT    Date: 3/4/2019 Time: 3:42 PM     Future Appointments   Date Time Provider Ruslan Kang   3/12/2019  6:00 PM Loulou Schafer [facility] [place]   3/21/2019  8:30 AM Angella Robison, PT [facility] [place]   3/28/2019  8:30 AM Angella Robison, PT [facility] [place]   2019  8:30 AM Wolf Sheikh, PT [facility] [place]

## 2019-03-12 ENCOUNTER — HOSPITAL ENCOUNTER (OUTPATIENT)
Dept: PHYSICAL THERAPY | Age: 54
Discharge: HOME | End: 2019-03-12
Payer: COMMERCIAL

## 2019-03-12 PROCEDURE — 97110 THERAPEUTIC EXERCISES: CPT

## 2019-03-12 NOTE — PROGRESS NOTES
PHYSICAL THERAPY - DAILY TREATMENT NOTE    Patient Name: Celeste Hernandez        Date: 3/12/2019  : 1965   YES Patient  Verified  Visit #:     Insurance: Payor: [address] [place] [address] / Plan: Danae Nino RPN / Product Type: Commerical /      In time: 5:55 P Out time: 7 P   Total Treatment Time: 60     BCBS/Medicare Time Tracking (below)   Total Timed Codes (min):  NA 1:1 Treatment Time:  NA     TREATMENT AREA = Pain in right shoulder [M25.511]    SUBJECTIVE  Pain Level (on 0 to 10 scale):  0  / 10   Medication Changes/New allergies or changes in medical history, any new surgeries or procedures? NO    If yes, update Summary List   Subjective Functional Status/Changes:  []  No changes reported     Patient reports that her arm has been feeling better since doing the stretches.             Modalities Rationale:     decrease pain to improve patient's ability to return to pain-free use of R shoulder with lifting    min [] Estim, type/location:                                      []  att     []  unatt     []  w/US     []  w/ice    []  w/heat    min []  Mechanical Traction: type/lbs                   []  pro   []  sup   []  int   []  cont    []  before manual    []  after manual    min []  Ultrasound, settings/location:      min []  Iontophoresis w/ dexamethasone, location:                                               []  take home patch       []  in clinic   10 min [x]  Ice     []  Heat    location/position: Supine to R shoulder    min []  Vasopneumatic Device, press/temp:     min []  Other:    [] Skin assessment post-treatment (if applicable):    []  intact    []  redness- no adverse reaction     []redness - adverse reaction:        50 min Therapeutic Exercise:  [x]  See flow sheet   Rationale:      increase ROM to improve the patients ability to return to pain-free lifting      Billed With/As:   [] TE   [] TA   [] Neuro   [] Self Care Patient Education: [x] Review HEP    [] Progressed/Changed HEP based on:   [] positioning   [] body mechanics   [] transfers   [] heat/ice application    [] other:       Other Objective/Functional Measures:    See flow sheet     Post Treatment Pain Level (on 0 to 10) scale:   0  / 10     ASSESSMENT  Assessment/Changes in Function:     Good tolerance to today's treatment, no increase in pain, improved tolerance to resisted ER today      []  See Progress Note/Recertification   Patient will continue to benefit from skilled PT services to modify and progress therapeutic interventions, address functional mobility deficits, address ROM deficits, address strength deficits, assess and modify postural abnormalities and instruct in home and community integration to attain remaining goals.    Progress toward goals / Updated goals:    Progressing towards goals established at [place] [place] Amandeep Madie  []  Upgrade activities as tolerated YES Continue plan of care   []  Discharge due to :    []  Other:      Therapist: Arnaud Daly, PT    Date: 3/12/2019 Time: 7:06 PM     Future Appointments   Date Time Provider Ruslan Kang   3/21/2019  5:30 PM Jame Marte [facility] [place]   3/28/2019  4:30 PM Caryle Rater, PT [facility] [place]   4/4/2019  8:30 AM Isabel Sheikh, PT [facility] [place]

## 2019-03-21 ENCOUNTER — APPOINTMENT (OUTPATIENT)
Dept: PHYSICAL THERAPY | Age: 54
End: 2019-03-21
Payer: COMMERCIAL

## 2019-03-26 ENCOUNTER — APPOINTMENT (OUTPATIENT)
Dept: PHYSICAL THERAPY | Age: 54
End: 2019-03-26
Payer: COMMERCIAL

## 2019-03-28 ENCOUNTER — HOSPITAL ENCOUNTER (OUTPATIENT)
Dept: PHYSICAL THERAPY | Age: 54
Discharge: HOME | End: 2019-03-28
Payer: COMMERCIAL

## 2019-03-28 PROCEDURE — 97110 THERAPEUTIC EXERCISES: CPT

## 2019-03-28 NOTE — PROGRESS NOTES
PHYSICAL THERAPY - DAILY TREATMENT NOTE    Patient Name: Piotr Oliva        Date: 3/28/2019  : 1965   YES Patient  Verified  Visit #:   3   of   12  Insurance: Payor: [address] [place] [address] / Plan: Leonor Whittington RPN / Product Type: Commerical /      In time: 3:55 P Out time: 4:45 P   Total Treatment Time: 50     BCBS/Medicare Time Tracking (below)   Total Timed Codes (min):  NA 1:1 Treatment Time:  NA     TREATMENT AREA = Pain in right shoulder [M25.511]    SUBJECTIVE  Pain Level (on 0 to 10 scale):  0  / 10   Medication Changes/New allergies or changes in medical history, any new surgeries or procedures? NO    If yes, update Summary List   Subjective Functional Status/Changes:  []  No changes reported     Patient reports she had some pain in her arm  that woke her up, her arm is feeling better overall, but her progress is slow, she will have an MRI on her shoulder next Friday,  19.         Modalities Rationale:     decrease pain to improve patient's ability to return to pain-free use of R shoulder   min [] Estim, type/location:                                      []  att     []  unatt     []  w/US     []  w/ice    []  w/heat    min []  Mechanical Traction: type/lbs                   []  pro   []  sup   []  int   []  cont    []  before manual    []  after manual    min []  Ultrasound, settings/location:      min []  Iontophoresis w/ dexamethasone, location:                                               []  take home patch       []  in clinic   10 min [x]  Ice     []  Heat    location/position: Supine to R shoulder     min []  Vasopneumatic Device, press/temp:     min []  Other:    [] Skin assessment post-treatment (if applicable):    []  intact    []  redness- no adverse reaction     []redness - adverse reaction:        40 min Therapeutic Exercise:  [x]  See flow sheet   Rationale:      increase ROM to improve the patients ability to return to reaching overhead     Billed With/As:   [] TE   [] TA   [] Neuro   [] Self Care Patient Education: [x] Review HEP    [] Progressed/Changed HEP based on:   [] positioning   [] body mechanics   [] transfers   [] heat/ice application    [] other:      Other Objective/Functional Measures: Added prone horiz ABD with 0#     Post Treatment Pain Level (on 0 to 10) scale:   0  / 10     ASSESSMENT  Assessment/Changes in Function:     Pt challenged by prone scapular strengthening exercise today, limited by weakness, no increase in pain      []  See Progress Note/Recertification   Patient will continue to benefit from skilled PT services to modify and progress therapeutic interventions, address functional mobility deficits, address ROM deficits, address strength deficits, assess and modify postural abnormalities and instruct in home and community integration to attain remaining goals.    Progress toward goals / Updated goals:    Progressing towards STG 2, 3     PLAN  [x]  Upgrade activities as tolerated YES Continue plan of care   []  Discharge due to :    []  Other:      Therapist: Le Padilla, PT    Date: 3/28/2019 Time: 5:36 PM     Future Appointments   Date Time Provider Ruslan Kang   4/4/2019  8:30 AM Mely Sheikh, PT [facility]

## 2019-04-02 ENCOUNTER — APPOINTMENT (OUTPATIENT)
Dept: PHYSICAL THERAPY | Age: 54
End: 2019-04-02

## 2019-06-13 NOTE — PROGRESS NOTES
[facility] AT [address] [place] [address] Sheeba Plass [address], [address] [place] [address], [address]  Phone: [phone]  Fax: [phone] SUMMARY  Patient Name: Eugene Sarabia : [id number]   Treatment/Medical Diagnosis: Pain in right shoulder [M25.511]   Referral Source: Michael Caceres MD     Date of Initial Visit: 3-04-19 Attended Visits: 3 Missed Visits: 1     SUMMARY OF TREATMENT  Therapeutic exercise including ROM, stretching, gentle strengthening, stabilization training, postural ed, patient education, HEP instruction, CP. CURRENT STATUS  Mrs. Geeta Maddox was last seen for PT on 3-28-19, she was only seen for 2 f/u treatments. She continued to report intermittent c/o pain in R shoulder however had reported decreased pain overall since initiation of PT. She was unable to be formally re-assessed prior to DC. She was scheduled for an MRI on 19, no further contact has been made with clinic to continue with PT, therefore patient to be discharged to home program.    Goal/Measure of Progress Goal Met? 1.  Establish HEP to prevent further disability. Status at last Eval: NA Current Status: HEP established yes   2. Patient will report decreased c/o pain to < or = 3-4/10 at worst to facilitate return to overhead reaching with manageable sx in R shoulder. Status at last Eval: 6/10 at worst Current Status: Unable to be re-assessed n/a   3. Improve FOTO score from 41 points to > or = 50 points indicating improved tolerance with ADLs in regards to R shoulder. Status at last Eval: 41 Current Status: Unable to be re-assessed n/a     RECOMMENDATIONS  Other: Self DC    If you have any questions/comments please contact us directly at [phone]. Thank you for allowing us to assist in the care of your patient.     Therapist Signature: MOHAMUD Hu, cert MDT Date:      Time: 4:06 PM

## 2019-11-25 ENCOUNTER — HOSPITAL ENCOUNTER (OUTPATIENT)
Dept: PHYSICAL THERAPY | Age: 54
Discharge: HOME | End: 2019-11-25
Payer: COMMERCIAL

## 2019-11-25 PROCEDURE — 97530 THERAPEUTIC ACTIVITIES: CPT

## 2019-11-25 PROCEDURE — 97162 PT EVAL MOD COMPLEX 30 MIN: CPT

## 2019-11-25 NOTE — PROGRESS NOTES
[facility] AT [address] [place] [address] Sheeba \[facility]\""[address], [address] [place] [address], [address]  Phone: [phone]  Fax: [phone] / [address] [address] PHYSICAL THERAPY SERVICES  Patient Name: Radha Perez : [id number]   Medical   Diagnosis: Pain in left shoulder [M25.512] Treatment Diagnosis: L shoulder pain   Onset Date: 2019     Referral Source: Cristel Davenport* Start of Care [facility]): 2019   Prior Hospitalization: See medical history Provider #: [id number]   Prior Level of Function: Manageable symptoms in L shoulder    Comorbidities: Previous h/o c/s pain, R shoulder pain, R bunionectomy 2018   Medications: Verified on Patient Summary List   The Plan of Care and following information is based on the information from the initial evaluation.   ==================================================================================  Assessment / key information:  Patient is a pleasant 62 y.o. female who presents to [facility] with L shoulder pain. She was previously treated at our facility for similar symptoms on her R UE as well as her neck from 2012 to Dec 2012 & as DC successfully to home program.  She reports recent onset of similar sx on L UE (she is RHD) in 2019 which were of unknown etiology. Previous c/o neck pain as well as H/A have improved over the last 3 weeks although current c/o pain in L UE are fairly constant in nature & worsen with activities such as prolonged sitting at her desk & using her ipad at home in the evenings. Sx improve with modifying workstation & sitting posture at work & typically on the weekends when she is not working. She reports c/o pain in L shoulder radiate into her upper arm & tingling into the dorsum of her hand.   She reported pain level at 3/10, 6/10 at worst & 2/10 at best.  Upon objective evaluation, patient demonstrates limited c/s ROM as follows primarily with ext 25% limited by ERP, L SB 25%, L rot 50% all reproduced L sided neck pain with c/s ext mildly reproducing arm sx. MMT revealed L shoulder ABD & flex to 4/5 with mild c/o pain upon MMT. Spurling's test was (+) on L & moderately painful & also reproduced distal L UE sx. Correction of posture with use of l/s roll resolved c/o L dysthesias into forearm today. Reviewed modifications of current workstation & sitting posture at home to promote upright posture. Patient can benefit PT interventions to improve posture, decrease pain, improve ROM & strength of L UE  to facilitate return to unlimited ADLs, work activities & overall functional status.   ==================================================================================  Eval Complexity: History MEDIUM  Complexity : 1-2 comorbidities / personal factors will impact the outcome/ POC ;  Examination  MEDIUM Complexity : 3 Standardized tests and measures addressing body structure, function, activity limitation and / or participation in recreation ; Presentation MEDIUM Complexity : Evolving with changing characteristics ;   Decision Making MEDIUM Complexity : FOTO score of 26-74; Overall Complexity MEDIUM  Problem List: pain affecting function, decrease ROM, decrease strength, impaired gait/ balance, decrease ADL/ functional abilitiies, decrease activity tolerance, decrease flexibility/ joint mobility, decrease transfer abilities and other FOTO 48 points   Treatment Plan may include any combination of the following: Therapeutic exercise, Therapeutic activities, Neuromuscular re-education, Physical agent/modality, Manual therapy, Patient education, Functional mobility training and Home safety training  Patient / Family readiness to learn indicated by: asking questions, trying to perform skills and interest  Persons(s) to be included in education: patient (P)  Barriers to Learning/Limitations: None  Measures taken:    Patient Goal (s): \"eliminate pain\" Patient self reported health status: good  Rehabilitation Potential: good   Short Term Goals: To be accomplished in  2  weeks:  1) Establish HEP to prevent further disability. 2) Patient will report decreased c/o pain to < or = 4-5/10 to facilitate prolonged sitting with manageable sx in L UE. 3) Improve FOTO score from 48 points to > or = 58 points indicating improved tolerance with ADLs in regards to L UE sx.  4) Improve c/s AROM for c/s ext, L SB/rot to 50% in all planes so ROM is available for driving activities without exacerbation of c/s sx.  Long Term Goals: To be accomplished in  4  weeks:  1) Improve FOTO score from 58 points to > or = 67 points indicating improved tolerance with ADLs in regards to L UE sx.  2) Patient will demonstrate (-) neural tension with Spurling's test on L UE to overhead activities with manageable sx. 3) Improve overall strength of L shoulder to 5-/5 so strength is available for return to light lifting activities at work/home. 4) Centralize L UE sx to level of c/s to facilitate return to fitness program without peripheralization of sx. Frequency / Duration:   Patient to be seen  2-3  times per week for 4  weeks:  Patient / Caregiver education and instruction: self care, activity modification, brace/ splint application and exercises    Therapist Signature: MOHAMUD Rowley, cert MDT Date: 35/76/0489   Certification Period: NA Time: 2:41 PM   ==================================================================================  I certify that the above Physical Therapy Services are being furnished while the patient is under my care. I agree with the treatment plan and certify that this therapy is necessary. Physician Signature:        Date:       Time:     Please sign and return to [facility] or you may fax the signed copy to [phone]. Thank you.

## 2019-11-25 NOTE — PROGRESS NOTES
PHYSICAL THERAPY - DAILY TREATMENT NOTE    Patient Name: Wilson Barnard        Date: 2019  : 1965   YES Patient  Verified  Visit #:     Insurance: Payor: Evelin Teague / Plan: Yary Tavares RPN / Product Type: Commerical /      In time: 2:40 P Out time: 3:45 P   Total Treatment Time: 60     BCBS/Medicare Time Tracking (below)   Total Timed Codes (min):  60 1:1 Treatment Time:  60     TREATMENT AREA =  Pain in left shoulder [M25.512]  SUBJECTIVE  Pain Level (on 0 to 10 scale):  3  / 10   Medication Changes/New allergies or changes in medical history, any new surgeries or procedures?     NO    If yes, update Summary List   Subjective Functional Status/Changes:  []  No changes reported     See POC           Modalities Rationale:     min [] Estim, type/location:                                      []  att     []  unatt     []  w/US     []  w/ice    []  w/heat    min []  Mechanical Traction: type/lbs                   []  pro   []  sup   []  int   []  cont    []  before manual    []  after manual    min []  Ultrasound, settings/location:      min []  Iontophoresis w/ dexamethasone, location:                                               []  take home patch       []  in clinic    min []  Ice     []  Heat    location/position:     min []  Vasopneumatic Device, press/temp:     min []  Other:    [] Skin assessment post-treatment (if applicable):    []  intact    []  redness- no adverse reaction     []redness - adverse reaction:          10 min Therapeutic Activity: [x]  See flow sheet   Rationale:    increase ROM and increase strength to improve the patients ability to return to pain-free sitting at work     Billed With/As:   [] TE   [] TA   [] Neuro   [] Self Care Patient Education: [x] Review HEP    [] Progressed/Changed HEP based on:   [] positioning   [] body mechanics   [] transfers   [] heat/ice application    [] other:      Other Objective/Functional Measures:    See POC     Post Treatment Pain Level (on 0 to 10) scale:   3  / 10     ASSESSMENT  Assessment/Changes in Function:     See POC     []  See Progress Note/Recertification   Patient will continue to benefit from skilled PT services to modify and progress therapeutic interventions, address functional mobility deficits, address ROM deficits, address strength deficits, analyze and modify body mechanics/ergonomics, assess and modify postural abnormalities and instruct in home and community integration to attain remaining goals. Progress toward goals / Updated goals:    Progressing towards goals established at [address] [facility] [address]  []  Upgrade activities as tolerated YES Continue plan of care   []  Discharge due to :    []  Other:      Therapist: Mirna Jackson, PT    Date: 11/25/2019 Time: 3:48 PM     No future appointments.

## 2019-12-03 ENCOUNTER — HOSPITAL ENCOUNTER (OUTPATIENT)
Dept: PHYSICAL THERAPY | Age: 54
Discharge: HOME | End: 2019-12-03
Payer: COMMERCIAL

## 2019-12-03 PROCEDURE — 97012 MECHANICAL TRACTION THERAPY: CPT

## 2019-12-03 PROCEDURE — 97110 THERAPEUTIC EXERCISES: CPT

## 2019-12-03 NOTE — PROGRESS NOTES
PHYSICAL THERAPY - DAILY TREATMENT NOTE    Patient Name: Ezra Connolly        Date: 12/3/2019  : 1965   YES Patient  Verified  Visit #:   2   of   12  Insurance: Payor: Homero Jj / Plan: Garrett Walker RPN / Product Type: Commerical /      In time: 9:30 A Out time: 10:20 A   Total Treatment Time: 45     BCBS/Medicare Time Tracking (below)   Total Timed Codes (min):  NA 1:1 Treatment Time:  NA     TREATMENT AREA = Pain in left shoulder [M25.512]    SUBJECTIVE  Pain Level (on 0 to 10 scale):  2  / 10   Medication Changes/New allergies or changes in medical history, any new surgeries or procedures?     YES    If yes, update Summary List   Subjective Functional Status/Changes:  []  No changes reported     Patient reports she had consult with Dr. Karl Moreno & she had 4 X-rays of her c/s & was issued a new order for her c/s          Modalities Rationale:     decrease pain to improve patient's ability to return to pain-free c/s AROM with driving   min [] Estim, type/location:                                      []  att     []  unatt     []  w/US     []  w/ice    []  w/heat   10 min [x]  Mechanical Traction: type/lbs 14#                  []  pro   [x]  sup   []  int   [x]  cont    []  before manual    []  after manual    min []  Ultrasound, settings/location:      min []  Iontophoresis w/ dexamethasone, location:                                               []  take home patch       []  in clinic   10  NB min []  Ice     [x]  Heat    location/position: Supine to upper t/s in supine    min []  Vasopneumatic Device, press/temp:     min []  Other:    [] Skin assessment post-treatment (if applicable):    [x]  intact    []  redness- no adverse reaction     []redness - adverse reaction:        35 min Therapeutic Exercise:  [x]  See flow sheet   Rationale:      increase ROM and increase strength to improve the patients ability to return to pain-free sitting at desk     Billed With/As:   [] TE   [] TA   [] Neuro   [] Self Care Patient Education: [x] Review HEP    [] Progressed/Changed HEP based on:   [] positioning   [] body mechanics   [] transfers   [] heat/ice application    [] other:      Other Objective/Functional Measures: Added RRIS with patient OP, RREIS with patient OP     Post Treatment Pain Level (on 0 to 10) scale:   1-2  / 10     ASSESSMENT  Assessment/Changes in Function:     Good tolerance to initial program, c/o paraesthesias into forearm decreased & RBAR, L sided c/s pain with RREIS INC c/s pain, NWAR     []  See Progress Note/Recertification   Patient will continue to benefit from skilled PT services to modify and progress therapeutic interventions, address functional mobility deficits, address ROM deficits, address strength deficits, analyze and address soft tissue restrictions, analyze and cue movement patterns, analyze and modify body mechanics/ergonomics, assess and modify postural abnormalities, address imbalance/dizziness and instruct in home and community integration to attain remaining goals.    Progress toward goals / Updated goals:    Progressing towards goals established at [address] [facility] [address]  []  Upgrade activities as tolerated YES Continue plan of care   []  Discharge due to :    []  Other:      Therapist: Omer Lorenz, PT    Date: 12/3/2019 Time: 1:14 PM     Future Appointments   Date Time Provider Ruslan Kang   12/5/2019  5:30 PM Jahaira [place] [facility]   12/10/2019  5:30 PM Mercedes Mccoy, PT [facility]   12/12/2019  5:30 PM Silvestre Sheikh, PT [facility]

## 2019-12-05 ENCOUNTER — HOSPITAL ENCOUNTER (OUTPATIENT)
Dept: PHYSICAL THERAPY | Age: 54
Discharge: HOME | End: 2019-12-05
Payer: COMMERCIAL

## 2019-12-05 PROCEDURE — 97012 MECHANICAL TRACTION THERAPY: CPT

## 2019-12-05 PROCEDURE — 97110 THERAPEUTIC EXERCISES: CPT

## 2019-12-05 NOTE — PROGRESS NOTES
PHYSICAL THERAPY - DAILY TREATMENT NOTE    Patient Name: Fely Gomez        Date: 2019  : 1965   YES Patient  Verified  Visit #:   3   of   12  Insurance: Payor: Laquita Coughlin / Plan: Alex CABRAL / Product Type: Commerical /      In time: 5:30 P Out time: 6:20 P   Total Treatment Time: 45     BCBS/Medicare Time Tracking (below)   Total Timed Codes (min):  NA 1:1 Treatment Time:  NA     TREATMENT AREA = Pain in left shoulder [M25.512]    SUBJECTIVE  Pain Level (on 0 to 10 scale):  1  / 10   Medication Changes/New allergies or changes in medical history, any new surgeries or procedures? NO    If yes, update Summary List   Subjective Functional Status/Changes:  []  No changes reported     Patient reports she is feeling better & the numbness on the bottom of her R arm near her hand is almost nonexistent.             Modalities Rationale:     decrease edema, decrease inflammation and decrease pain to improve patient's ability to return to pain-free sitting at work    min [] Estim, type/location:                                      []  att     []  unatt     []  w/US     []  w/ice    []  w/heat   10 min [x]  Mechanical Traction: type/lbs 14# supine to c/s                   []  pro   []  sup   []  int   []  cont    []  before manual    []  after manual    min []  Ultrasound, settings/location:      min []  Iontophoresis w/ dexamethasone, location:                                               []  take home patch       []  in clinic   10  NB min []  Ice     [x]  Heat    location/position: Supine to c/s with txn     min []  Vasopneumatic Device, press/temp:     min []  Other:    [] Skin assessment post-treatment (if applicable):    [x]  intact    []  redness- no adverse reaction     []redness - adverse reaction:        35 min Therapeutic Exercise:  [x]  See flow sheet   Rationale:      increase ROM and increase strength to improve the patients ability to return to pain-free reaching using R UE     Billed With/As:   [] TE   [] TA   [] Neuro   [] Self Care Patient Education: [x] Review HEP    [] Progressed/Changed HEP based on:   [] positioning   [] body mechanics   [] transfers   [] heat/ice application    [] other:      Other Objective/Functional Measures: Added Theraband rows, 1/2 FR stretch, supine horiz ABD      Post Treatment Pain Level (on 0 to 10) scale:   1  / 10     ASSESSMENT  Assessment/Changes in Function:     Good progress with current program, c/s ext AROM improving     []  See Progress Note/Recertification   Patient will continue to benefit from skilled PT services to modify and progress therapeutic interventions, address functional mobility deficits, address ROM deficits, address strength deficits, analyze and modify body mechanics/ergonomics, assess and modify postural abnormalities and instruct in home and community integration to attain remaining goals.    Progress toward goals / Updated goals:    Progressing towards STG 2, 3     PLAN  []  Upgrade activities as tolerated YES Continue plan of care   []  Discharge due to :    []  Other:      Therapist: Cosme Farrar, PT    Date: 12/5/2019 Time: 6:57 PM     Future Appointments   Date Time Provider Ruslan Kang   12/10/2019  5:30 PM Joseph Perches [facility]   12/12/2019  5:30 PM Aly Sheikh, PT [facility]

## 2019-12-10 ENCOUNTER — HOSPITAL ENCOUNTER (OUTPATIENT)
Dept: PHYSICAL THERAPY | Age: 54
Discharge: HOME | End: 2019-12-10
Payer: COMMERCIAL

## 2019-12-10 PROCEDURE — 97110 THERAPEUTIC EXERCISES: CPT

## 2019-12-10 PROCEDURE — 97012 MECHANICAL TRACTION THERAPY: CPT

## 2019-12-11 NOTE — PROGRESS NOTES
PHYSICAL THERAPY - DAILY TREATMENT NOTE    Patient Name: Edith Barnes        Date: 12/10/2019  : 1965   YES Patient  Verified  Visit #:   4   of   12  Insurance: Payor: Lisa Botello / Plan: Yuniel Espino RPN / Product Type: Commerical /      In time: 5:35 P Out time: 6:30 P   Total Treatment Time: 50/  35     BCBS/Medicare Time Tracking (below)   Total Timed Codes (min):  NA 1:1 Treatment Time:  NA     TREATMENT AREA = Pain in left shoulder [M25.512]    SUBJECTIVE  Pain Level (on 0 to 10 scale):  1  / 10   Medication Changes/New allergies or changes in medical history, any new surgeries or procedures? NO    If yes, update Summary List   Subjective Functional Status/Changes:  []  No changes reported     Patient reports average pain level 1-2/10, no longer constant in nature, forearm tingling is almost gone.            Modalities Rationale:     decrease edema, decrease inflammation and decrease pain to improve patient's ability to return to pain-free sitting at work    min [] Estim, type/location:                                      []  att     []  unatt     []  w/US     []  w/ice    []  w/heat   10 min [x]  Mechanical Traction: type/lbs 14#                  []  pro   [x]  sup   []  int   [x]  cont    []  before manual    []  after manual    min []  Ultrasound, settings/location:      min []  Iontophoresis w/ dexamethasone, location:                                               []  take home patch       []  in clinic   10  NB min []  Ice     [x]  Heat    location/position: Supine to c/s    min []  Vasopneumatic Device, press/temp:     min []  Other:    [] Skin assessment post-treatment (if applicable):    [x]  intact    []  redness- no adverse reaction     []redness - adverse reaction:        40/  25 min Therapeutic Exercise:  [x]  See flow sheet   Rationale:      increase ROM and increase strength to improve the patients ability to return to PLOF     Billed With/As:   [] TE   [] TA   [] Neuro   [] Self Care Patient Education: [x] Review HEP    [] Progressed/Changed HEP based on:   [] positioning   [] body mechanics   [] transfers   [] heat/ice application    [] other:      Other Objective/Functional Measures:    C/s AROM, L SB 75%, SB 50%, ext 50% no c/o pain  (+) ULTT on L (although decreased sensitivity)     Post Treatment Pain Level (on 0 to 10) scale:   0  / 10     ASSESSMENT  Assessment/Changes in Function:     Good reduction of L UE sx     []  See Progress Note/Recertification   Patient will continue to benefit from skilled PT services to modify and progress therapeutic interventions, address functional mobility deficits, address ROM deficits, address strength deficits, analyze and modify body mechanics/ergonomics, assess and modify postural abnormalities and instruct in home and community integration to attain remaining goals.    Progress toward goals / Updated goals:    Progressing towards STG 3 & STG 1, 2, 4 met     PLAN  []  Upgrade activities as tolerated YES Continue plan of care   []  Discharge due to :    [x]  Other: PN n/v     Therapist: Clare Quinones PT    Date: 12/10/2019 Time: 7:09 PM     Future Appointments   Date Time Provider Ruslan Kang   12/12/2019  4:30 PM Terra Sheikh, PT [facility]

## 2019-12-12 ENCOUNTER — HOSPITAL ENCOUNTER (OUTPATIENT)
Dept: PHYSICAL THERAPY | Age: 54
Discharge: HOME | End: 2019-12-12
Payer: COMMERCIAL

## 2019-12-12 PROCEDURE — 97110 THERAPEUTIC EXERCISES: CPT

## 2019-12-12 PROCEDURE — 97012 MECHANICAL TRACTION THERAPY: CPT

## 2019-12-12 NOTE — PROGRESS NOTES
PHYSICAL THERAPY - DAILY TREATMENT NOTE    Patient Name: Isabella Medley        Date: 2019  : 1965   YES Patient  Verified  Visit #:     Insurance: Payor: Nafisa Norman / Plan: Suleman CABRAL / Product Type: Commerical /      In time: 4:35 P Out time: 5:30 P   Total Treatment Time: 50/  35     BCBS/Medicare Time Tracking (below)   Total Timed Codes (min):  NA 1:1 Treatment Time:  NA     TREATMENT AREA = Pain in left shoulder [M25.512]    SUBJECTIVE  Pain Level (on 0 to 10 scale):  1  / 10   Medication Changes/New allergies or changes in medical history, any new surgeries or procedures?     NO    If yes, update Summary List   Subjective Functional Status/Changes:  []  No changes reported     See progress note            Modalities Rationale:     decrease pain to improve patient's ability to return to pain-free sitting at work    min [] Estim, type/location:                                      []  att     []  unatt     []  w/US     []  w/ice    []  w/heat   10 min [x]  Mechanical Traction: type/lbs                   []  pro   []  sup   []  int   []  cont    []  before manual    []  after manual    min []  Ultrasound, settings/location:      min []  Iontophoresis w/ dexamethasone, location:                                               []  take home patch       []  in clinic   10  NB min []  Ice     [x]  Heat    location/position: C/s in supine    min []  Vasopneumatic Device, press/temp:     min []  Other:    [] Skin assessment post-treatment (if applicable):    []  intact    []  redness- no adverse reaction     []redness - adverse reaction:        40/  25 min Therapeutic Exercise:  [x]  See flow sheet   Rationale:      increase ROM and increase strength to improve the patients ability to return to pain-free use of L UE     Billed With/As:   [] TE   [] TA   [] Neuro   [] Self Care Patient Education: [x] Review HEP    [] Progressed/Changed HEP based on:   [] positioning   [] body mechanics   [] transfers   [] heat/ice application    [] other:      Other Objective/Functional Measures:    FOTO 59         Post Treatment Pain Level (on 0 to 10) scale:   1  / 10     ASSESSMENT  Assessment/Changes in Function:     Good progress with current program & increase in function      []  See Progress Note/Recertification   Patient will continue to benefit from skilled PT services to modify and progress therapeutic interventions, address functional mobility deficits, address ROM deficits, address strength deficits, assess and modify postural abnormalities and instruct in home and community integration to attain remaining goals. Progress toward goals / Updated goals:    Progressing towards LTG 1 & STG 1, 2, 3, 4     PLAN  []  Upgrade activities as tolerated YES Continue plan of care   []  Discharge due to :    []  Other:      Therapist: Ilana Peña PT    Date: 12/12/2019 Time: 5:45 PM     No future appointments.

## 2019-12-13 NOTE — PROGRESS NOTES
[facility] AT [address] [place] [address] [address], [address] [place] [address], [address]  Phone: [phone]  Fax: [phone]  PROGRESS NOTE  Patient Name: Wilson Barnard :    Treatment/Medical Diagnosis: Pain in left shoulder [M25.512]   Referral Source: Rebecca Paredes MD      Date of Initial Visit: 19 Attended Visits: 5 Missed Visits: 0     SUMMARY OF TREATMENT  Therapeutic exercise including ROM, stretching, gentle strengthening, postural ed, patient education, HEP instruction, MHP, mechanical traction, HEP instruction. CURRENT STATUS  Mrs. Herbert Mcdaniel has made good progress with & reports decreased c/o neck pain as well as L UE pain. Previous c/o L UE pain have decreased with primary c/o numbness into L forearm which is now intermittent in nature. She is able to manage her sx with posture correction at home & modification of computer workstation. She continues to have (+) ULTT on L UE although decreased in sensitivity since initiation of PT. Spurling's test is now (-) for reproduction of neck & L UE sx. Good response to current program & mechanical c/s txn (please note patient's insurance does not cover home mechanical c/s txn unit). Please see below for other improvements with PT. Goal/Measure of Progress Goal Met? 1.  Establish HEP to prevent further disability. Status at last Eval: NA  Current Status: HEP established, good compliance yes   2. Patient will report decreased c/o pain to < or = 4-5/10 to facilitate prolonged sitting with manageable sx in L UE. Status at last Eval: Average 3/10  At worst 6/10  At best 2/10 Current Status: Pt reports minimal to no c/o pain in c/s & L UE yes   3. Improve FOTO score from 48 points to > or = 58 points indicating improved tolerance with ADLs in regards to L UE sx. Status at last Eval: 48 Current Status: 59 yes   4.   Improve c/s AROM for c/s ext, L SB/rot to 50% in all planes so ROM is available for driving activities without exacerbation of c/s sx. Status at last Eval: 25% ext, L SB  50% L rot Current Status: Ext, L SB/rot all 50%, no c/o pain, limited by tightness, no longer painful yes       RECOMMENDATIONS  Patient to be placed on hold, to manage her symptoms with her home program, f/u with PT on a prn basis. F/u with ortho on 12-16-19. If you have any questions/comments please contact us directly at [phone]. Thank you for allowing us to assist in the care of your patient. Therapist Signature: Rada Gottron, MPT, cert MDT Date: 44/33/9741     Time: 7:32 AM   NOTE TO PHYSICIAN:  PLEASE COMPLETE THE ORDERS BELOW AND FAX TO   [facility]: [phone]. If you are unable to process this request in 24 hours please contact our office: [phone].    ___ I have read the above report and request that my patient continue as recommended.   ___ I have read the above report and request that my patient continue therapy with the following changes/special instructions:_________________________________________________________   ___ I have read the above report and request that my patient be discharged from therapy.      Physician Signature:        Date:       Time:

## 2020-02-13 NOTE — PROGRESS NOTES
[address] [place] [address] PHYSICAL THERAPY AT [address] [place] [address]  [address], [address] ,[address], [address]  Phone: [phone]  Fax: [phone] SUMMARY  Patient Name: Ramone Marcano : [birth year]   Treatment/Medical Diagnosis: Pain in left shoulder [M25.512]   Referral Source: Tessa Painter*     Date of Initial Visit: 19 Attended Visits: 5 Missed Visits: 0     SUMMARY OF TREATMENT  Therapeutic exercise including ROM, stretching, gentle strengthening, postural ed, patient education, HEP instruction, MHP, mechanical traction, HEP instruction. CURRENT STATUS  Mrs. Fabi Guerra was last seen for PT on 19, please see progress note on this day for progress with PT. She was placed on hold from PT & was recommended to continue with progressive HEP to maintain current level of function. No further contact has been made with clinic to continue with PT, therefore patient to be discharged to home program.      RECOMMENDATIONS  Discontinue therapy. Progressing towards or have reached established goals. If you have any questions/comments please contact us directly at [phone]. Thank you for allowing us to assist in the care of your patient.     Therapist Signature: Rada Gottron, MPT, cert MDT Date:      Time: 7:13 AM

## 2020-06-30 ENCOUNTER — APPOINTMENT (OUTPATIENT)
Dept: PHYSICAL THERAPY | Age: 55
End: 2020-06-30

## 2021-12-14 ENCOUNTER — APPOINTMENT (OUTPATIENT)
Dept: PHYSICAL THERAPY | Age: 56
End: 2021-12-14

## 2022-01-04 ENCOUNTER — HOSPITAL ENCOUNTER (OUTPATIENT)
Dept: PHYSICAL THERAPY | Age: 57
Discharge: HOME | End: 2022-01-04
Payer: COMMERCIAL

## 2022-01-04 PROCEDURE — 97140 MANUAL THERAPY 1/> REGIONS: CPT

## 2022-01-04 PROCEDURE — 97162 PT EVAL MOD COMPLEX 30 MIN: CPT

## 2022-01-04 NOTE — PROGRESS NOTES
[facility] AT [address] [place] [address]  [address]on[address] A[address], [address] ,[address], [address]  Phone: [phone]  Fax: [phone] / [address] [address] PHYSICAL THERAPY SERVICES  Patient Name: Lilian Walls :    Medical   Diagnosis: Neck pain [M54.2]  Right arm weakness [R29.898] Treatment Diagnosis: Neck pain, R UE pain   Onset Date: 2021     Referral Source: Teodoro Perez MD [facility] [place]): 2022   Prior Hospitalization: See medical history Provider #: [id number]   Prior Level of Function: Pain-free with ADLs/work   Comorbidities: Episodic h/o neck pain, OP   Medications: Verified on Patient Summary List   The Plan of Care and following information is based on the information from the initial evaluation.   ==================================================================================  Assessment / key information:  Patient is a pleasant 60 y.o. female who presents to [facility] [address] with neck pain & R UE. Patient reports initial onset of sx in early 2021 which were of unknown etiology. She reports progressive worsening of c/s as well as radicular sx into R>L UE, with tingling sx into 2nd-5th fingers. She reports completing a 12 day round of prednisone & her sx slowly returned ~ 3 days after completion of this dose. Current c/o pain are intermittent in nature & worsen with activities such as prolonged sitting & use of mouse at work & as the day progresses. Sx improve on her rest days from work & avoiding prolonged sitting at work. Average reported pain level at 6/10, 6/10 at worst & 0-1/10 at best.  Upon objective evaluation, patient demonstrates c/s AROM as follows grossly 75% in all planes of motion. RRIS & RREIS increased sx into R upper arm, RWAR. Supinelying also increased paraesthesias into R hand & was decreased in intensity with use of c/s roll in 1 pillow.   MMT revealed was WFL for upper quarter screen. (+) R ULTT today as well as Spurling's test on R. Patient can benefit PT interventions to improve posture, decrease pain, & centralize R UE sx to facilitate return to unlimited ADLs, work activities & overall functional status.   ==================================================================================  Eval Complexity: History MEDIUM  Complexity : 1-2 comorbidities / personal factors will impact the outcome/ POC ;  Examination  MEDIUM Complexity : 3 Standardized tests and measures addressing body structure, function, activity limitation and / or participation in recreation ; Presentation MEDIUM Complexity : Evolving with changing characteristics ; Decision Making MEDIUM Complexity : FOTO score of 26-74; Overall Complexity MEDIUM  Problem List: pain affecting function, decrease ROM, decrease ADL/ functional abilitiies, decrease activity tolerance, decrease flexibility/ joint mobility and decrease transfer abilities   Treatment Plan may include any combination of the following: Therapeutic exercise, Therapeutic activities, Neuromuscular re-education, Physical agent/modality, Manual therapy, Patient education, Self Care training, Functional mobility training and Home safety training  Patient / Family readiness to learn indicated by: asking questions, trying to perform skills and interest  Persons(s) to be included in education: patient (P)  Barriers to Learning/Limitations: None  Measures taken:    Patient Goal (s): \"get rid of numbness\"   Patient self reported health status: good  Rehabilitation Potential: good   Short Term Goals: To be accomplished in  2  weeks:  1) Establish HEP to prevent further disability. 2) Patient will report decreased c/o pain to < or = 4-5/10 to facilitate prolonged sitting at workstation with manageable sx in c/s & R UE.   3) Patient will be able to demonstrate the appropriate sitting posture with or without use of l/s roll to facilitate sitting activities at home/work. 4) Patient will be able to tolerate sleeping uninterrupted > or = 4-5 hours with use of c/s roll & postural ed.  Long Term Goals: To be accomplished in  4  weeks:  1) Improve FOTO score from 61 points to > or = 69 points indicating improved tolerance with ADLs in regards to c/s.  2) Patient to report 50% improvement in overall function in preparation for return to recreational activities with manageable sx. 3) Patient will report decreased c/o pain to < or = 3-4/10 to facilitate prolonged sitting at workstation with manageable sx in c/s & R UE. 4) Centralize R/L UE sx to level of c/s to facilitate return to pain-free work activities without peripheralization of sx. Frequency / Duration:   Patient to be seen  2-3  times per week for 4  weeks:  Patient / Caregiver education and instruction: self care, activity modification, brace/ splint application and exercises    Therapist Signature: MOHAMUD Douglas, cert MDT Date: 3/7/9805   Certification Period: None Time: 5:21 PM   =================================================================================  I certify that the above Physical Therapy Services are being furnished while the patient is under my care. I agree with the treatment plan and certify that this therapy is necessary.     Physician Signature:                                                             Date:                                     Time:                                                                       Juan Miguel Pastor MD

## 2022-01-04 NOTE — PROGRESS NOTES
PHYSICAL THERAPY - DAILY TREATMENT NOTE    Patient Name: Elisa Norman        Date: 2022  : 1965   YES Patient  Verified  Visit #:   1   of   5  Insurance: Payor: Angelito Adjutant / Plan: García CABRAL / Product Type: Commerical /      In time: 5:20 P Out time: 6:15 P   Total Treatment Time: 50     BCBS/Medicare Time Tracking (below)   Total Timed Codes (min):  NA 1:1 Treatment Time:  NA     TREATMENT AREA =  Neck pain [M54.2]  Right arm weakness [R29.898]    SUBJECTIVE  Pain Level (on 0 to 10 scale):  3  / 10   Medication Changes/New allergies or changes in medical history, any new surgeries or procedures?     NO    If yes, update Summary List   Subjective Functional Status/Changes:  []  No changes reported     See POC            Modalities Rationale:      min [] Estim, type/location:                                      []  att     []  unatt     []  w/US     []  w/ice    []  w/heat    min []  Mechanical Traction: type/lbs                   []  pro   []  sup   []  int   []  cont    []  before manual    []  after manual    min []  Ultrasound, settings/location:      min []  Iontophoresis w/ dexamethasone, location:                                               []  take home patch       []  in clinic    min []  Ice     []  Heat    location/position:     min []  Vasopneumatic Device, press/temp:    If using vaso (only need to measure limb vaso being performed on)      pre-treatment girth :       post-treatment girth :       measured at (landmark location) :      min []  Other:    [] Skin assessment post-treatment (if applicable):    []  intact    []  redness- no adverse reaction                  []redness - adverse reaction:        10 min Manual Therapy: Gentle manual c/s traction, SOR in supine   Rationale:      decrease pain and increase ROM to improve patient's ability to return to pain-free R UE with sleeping at night  The manual therapy interventions were performed at a separate and distinct time from the therapeutic activities interventions. Billed With/As:   [] TE   [] TA   [] Neuro   [] Self Care Patient Education: [x] Review HEP    [] Progressed/Changed HEP based on:   [] positioning   [] body mechanics   [] transfers   [] heat/ice application    [] other:      Other Objective/Functional Measures:    Immediate reduction of R UE sx with manual c/s traction today      Post Treatment Pain Level (on 0 to 10) scale:   3  / 10     ASSESSMENT  Assessment/Changes in Function:     See POC     []  See Progress Note/Recertification   Patient will continue to benefit from skilled PT services to modify and progress therapeutic interventions, address functional mobility deficits, address ROM deficits, address strength deficits, analyze and address soft tissue restrictions, analyze and cue movement patterns, analyze and modify body mechanics/ergonomics, assess and modify postural abnormalities and instruct in home and community integration to attain remaining goals.    Progress toward goals / Updated goals:    Progressing towards goals established at [address] [facility] [id number]  []  Upgrade activities as tolerated YES Continue plan of care   []  Discharge due to :    []  Other:      Therapist: Gorman Hashimoto, PT    Date: 1/4/2022 Time: 6:18 PM     Future Appointments   Date Time Provider Ruslan Kang   1/11/2022  5:15 PM Nelida Conner, PT [facility]   1/13/2022  6:00 PM Nelida Conner PT [facility]   1/18/2022  5:15 PM Nelida Conner PT [facility]   1/20/2022  5:45 PM Sis Sheikh, PT [facility]

## 2022-01-11 ENCOUNTER — HOSPITAL ENCOUNTER (OUTPATIENT)
Dept: PHYSICAL THERAPY | Age: 57
Discharge: HOME | End: 2022-01-11
Payer: COMMERCIAL

## 2022-01-11 PROCEDURE — 97530 THERAPEUTIC ACTIVITIES: CPT

## 2022-01-11 PROCEDURE — 97112 NEUROMUSCULAR REEDUCATION: CPT

## 2022-01-11 PROCEDURE — 97140 MANUAL THERAPY 1/> REGIONS: CPT

## 2022-01-11 NOTE — PROGRESS NOTES
PHYSICAL THERAPY - DAILY TREATMENT NOTE    Patient Name: Ting Wang        Date: 2022  : 1965   YES Patient  Verified  Visit #:   2   of   12  Insurance: Payor: Kendra Barraza / Plan: Mook CABRAL / Product Type: Commerical /      In time: 5:15 P Out time: 6:10 P   Total Treatment Time: 50     BCBS/Medicare Time Tracking (below)   Total Timed Codes (min):  NA 1:1 Treatment Time:  NA     TREATMENT AREA =  Neck pain [M54.2]  Right arm weakness [R29.898]    SUBJECTIVE  Pain Level (on 0 to 10 scale):  3-4  / 10   Medication Changes/New allergies or changes in medical history, any new surgeries or procedures? NO    If yes, update Summary List   Subjective Functional Status/Changes:  []  No changes reported     Anisa reports she has some tingling & numbness down her R arm today, into her forearm.           Modalities Rationale:     decrease pain to improve patient's ability to return to pain-free ADLs   min [] Estim, type/location:                                      []  att     []  unatt     []  w/US     []  w/ice    []  w/heat    min []  Mechanical Traction: type/lbs                   []  pro   []  sup   []  int   []  cont    []  before manual    []  after manual    min []  Ultrasound, settings/location:      min []  Iontophoresis w/ dexamethasone, location:                                               []  take home patch       []  in clinic   10 min []  Ice     [x]  Heat    location/position: Supine to c/s     min []  Vasopneumatic Device, press/temp:    If using vaso (only need to measure limb vaso being performed on)      pre-treatment girth :       post-treatment girth :       measured at (landmark location) :      min []  Other:    [] Skin assessment post-treatment (if applicable):    [x]  intact    [x]  redness- no adverse reaction                  []redness - adverse reaction:        15 min Neuromuscular Re-ed: [x]  See flow sheet   Rationale:    increase ROM and increase strength to improve the patients ability to return to pain-free use of R UE with work     15 min Therapeutic Activity: [x]  See flow sheet   Rationale:    increase ROM, improve coordination and increase proprioception to improve the patients ability to return to pain-free sitting at work station    10 min Manual Therapy: Manual c/s traction, SOR in supine, STM/MFR to c/s in supine   Rationale:      decrease pain, increase ROM, increase tissue extensibility, decrease trigger points and increase postural awareness to improve patient's ability to return to pain-free driving  The manual therapy interventions were performed at a separate and distinct time from the therapeutic activities interventions. Billed With/As:   [] TE   [] TA   [] Neuro   [] Self Care Patient Education: [x] Review HEP    [] Progressed/Changed HEP based on:   [] positioning   [] body mechanics   [] transfers   [] heat/ice application    [] other:      Other Objective/Functional Measures:    Initiated exercise per flow sheet     Post Treatment Pain Level (on 0 to 10) scale:   3  / 10     ASSESSMENT  Assessment/Changes in Function:     Unable to tolerate fully supinelying without increase in c/s, use of c/s roll with 1 pillow reduced R UE sx, fair tolerance to initial program      []  See Progress Note/Recertification   Patient will continue to benefit from skilled PT services to modify and progress therapeutic interventions, address functional mobility deficits, address ROM deficits, address strength deficits, analyze and address soft tissue restrictions, analyze and cue movement patterns, analyze and modify body mechanics/ergonomics, assess and modify postural abnormalities and instruct in home and community integration to attain remaining goals.    Progress toward goals / Updated goals:    Progressing towards STG 2 & 3     PLAN  []  Upgrade activities as tolerated YES Continue plan of care   []  Discharge due to :    []  Other:      Therapist: Kim Leung PT    Date: 1/11/2022 Time: 5:23 PM     Future Appointments   Date Time Provider Ruslan Kang   1/13/2022  6:00 PM Jason Sullivan [facility]   1/18/2022  5:15 PM Keren Eubanks, PRABHA [facility]   1/20/2022  5:45 PM Gwyn Sheikh, PT [facility]

## 2022-01-13 ENCOUNTER — HOSPITAL ENCOUNTER (OUTPATIENT)
Dept: PHYSICAL THERAPY | Age: 57
Discharge: HOME | End: 2022-01-13
Payer: COMMERCIAL

## 2022-01-13 PROCEDURE — 97110 THERAPEUTIC EXERCISES: CPT

## 2022-01-13 PROCEDURE — 97012 MECHANICAL TRACTION THERAPY: CPT

## 2022-01-13 PROCEDURE — 97140 MANUAL THERAPY 1/> REGIONS: CPT

## 2022-01-14 NOTE — PROGRESS NOTES
PHYSICAL THERAPY - DAILY TREATMENT NOTE    Patient Name: Marcie Saunders        Date: 2022  : 1965   YES Patient  Verified  Visit #:   3   of   12  Insurance: Payor: Eliot Rdz / Plan: Hunter CABRAL / Product Type: Commerical /      In time: 6:00 P Out time: 6:55 P   Total Treatment Time: 50     BCBS/Medicare Time Tracking (below)   Total Timed Codes (min):  NA 1:1 Treatment Time:  NA     TREATMENT AREA =  Neck pain [M54.2]  Right arm weakness [R29.898]    SUBJECTIVE  Pain Level (on 0 to 10 scale):  3  / 10   Medication Changes/New allergies or changes in medical history, any new surgeries or procedures? NO    If yes, update Summary List   Subjective Functional Status/Changes:  []  No changes reported     Patient reports that she is starting to feel numbness going into her L arm, she tried doing the band exercises at home but believes she over did it.             Modalities Rationale:     decrease pain to improve patient's ability to return to pain-free use of R/L UE with ADLs   min [] Estim, type/location:                                      []  att     []  unatt     []  w/US     []  w/ice    []  w/heat   10 min [x]  Mechanical Traction: type/lbs 18#                  []  pro   [x]  sup   [x]  int   []  cont    []  before manual    [x]  after manual    min []  Ultrasound, settings/location:      min []  Iontophoresis w/ dexamethasone, location:                                               []  take home patch       []  in clinic   10 min []  Ice     [x]  Heat    location/position: Supine to c/s     min []  Vasopneumatic Device, press/temp:    If using vaso (only need to measure limb vaso being performed on)      pre-treatment girth :       post-treatment girth :       measured at (landmark location) :      min []  Other:    [] Skin assessment post-treatment (if applicable):    []  intact    []  redness- no adverse reaction                  []redness - adverse reaction:        20 min Therapeutic Exercise:  [x]  See flow sheet   Rationale:      increase ROM and increase strength to improve the patients ability to return to pain-free work activities     10 min Manual Therapy: Gentle manual c/s traction in supine, SOR   Rationale:      decrease pain, increase ROM, increase tissue extensibility, decrease trigger points and increase postural awareness to improve patient's ability to return to pain-free c/s AROM with driving  The manual therapy interventions were performed at a separate and distinct time from the therapeutic activities interventions. Billed With/As:   [] TE   [] TA   [] Neuro   [] Self Care Patient Education: [x] Review HEP    [] Progressed/Changed HEP based on:   [] positioning   [] body mechanics   [] transfers   [] heat/ice application    [] other:      Other Objective/Functional Measures:    Modified exercises per flow sheet given increase in R/L forearm paraesthesias     Post Treatment Pain Level (on 0 to 10) scale:   2-3  / 10     ASSESSMENT  Assessment/Changes in Function:     Unable to lay fully supine on foam roll for stretch due to increase in UE sx, able to tolerate manual therapy with use of 1 pillow with c/s roll as well as trial of mechanical c/s txn on raised plinth     []  See Progress Note/Recertification   Patient will continue to benefit from skilled PT services to modify and progress therapeutic interventions, address ROM deficits, address strength deficits, analyze and address soft tissue restrictions, analyze and cue movement patterns, analyze and modify body mechanics/ergonomics and instruct in home and community integration to attain remaining goals.    Progress toward goals / Updated goals:    Progressing towards STG 2 & 3     PLAN  []  Upgrade activities as tolerated YES Continue plan of care   []  Discharge due to :    []  Other:      Therapist: Joseph Chiu, PT    Date: 1/13/2022 Time: 7:24 PM     Future Appointments   Date Time Provider Ruslan Kang 1/18/2022  5:15 PM Queta Carrasquillo, PT [facility]   1/20/2022  5:45 PM Eli Sheikh, PT [facility]

## 2022-01-18 ENCOUNTER — HOSPITAL ENCOUNTER (OUTPATIENT)
Dept: PHYSICAL THERAPY | Age: 57
Discharge: HOME | End: 2022-01-18
Payer: COMMERCIAL

## 2022-01-18 PROCEDURE — 97012 MECHANICAL TRACTION THERAPY: CPT

## 2022-01-18 PROCEDURE — 97110 THERAPEUTIC EXERCISES: CPT

## 2022-01-18 PROCEDURE — 97140 MANUAL THERAPY 1/> REGIONS: CPT

## 2022-01-18 NOTE — PROGRESS NOTES
PHYSICAL THERAPY - DAILY TREATMENT NOTE    Patient Name: Josefina Wood        Date: 2022  : 1965   YES Patient  Verified  Visit #:     Insurance: Payor: Tori Dimas / Plan: Tomasz Bennett RPN / Product Type: Commerical /      In time: 5:20 P Out time: 6:12 P   Total Treatment Time: 50     BCBS/Medicare Time Tracking (below)   Total Timed Codes (min):  NA 1:1 Treatment Time:  NA     TREATMENT AREA =  Neck pain [M54.2]  Right arm weakness [R29.898]    SUBJECTIVE  Pain Level (on 0 to 10 scale):  3  / 10   Medication Changes/New allergies or changes in medical history, any new surgeries or procedures? NO    If yes, update Summary List   Subjective Functional Status/Changes:  []  No changes reported     Patient reports she borrowed a home traction unit but she doesn't think she was set up in it properly.            Modalities Rationale:     decrease pain and increase tissue extensibility to improve patient's ability to return to pain-free use of B UE with ADLs   min [] Estim, type/location:                                      []  att     []  unatt     []  w/US     []  w/ice    []  w/heat   10 min [x]  Mechanical Traction: type/lbs 19# in supine                  []  pro   [x]  sup   []  int   [x]  cont    []  before manual    [x]  after manual    min []  Ultrasound, settings/location:      min []  Iontophoresis w/ dexamethasone, location:                                               []  take home patch       []  in clinic   10 min []  Ice     [x]  Heat    location/position:     min []  Vasopneumatic Device, press/temp:    If using vaso (only need to measure limb vaso being performed on)      pre-treatment girth :       post-treatment girth :       measured at (landmark location) :      min []  Other:    [] Skin assessment post-treatment (if applicable):    [x]  intact    [x]  redness- no adverse reaction                  []redness - adverse reaction:        30 min Therapeutic Exercise:  [x]  See flow sheet   Rationale:      increase ROM and increase strength to improve the patients ability to return to pain-free sitting at workstation      10 min Manual Therapy: Gentle manual c/s traction in supine    Rationale:      decrease pain, increase ROM, increase tissue extensibility, decrease trigger points and increase postural awareness to improve patient's ability to return to pain-free unsupported sitting without increase in B UE pain   The manual therapy interventions were performed at a separate and distinct time from the therapeutic activities interventions. Billed With/As:   [] TE   [] TA   [] Neuro   [] Self Care Patient Education: [x] Review HEP    [] Progressed/Changed HEP based on:   [] positioning   [] body mechanics   [] transfers   [] heat/ice application    [] other:      Other Objective/Functional Measures:    Resumed exercises per flow sheet     Post Treatment Pain Level (on 0 to 10) scale:   2  / 10     ASSESSMENT  Assessment/Changes in Function:     Improved tolerance to exercises today, although reproduction of R UE sx with STM, good relief with manual c/s traction as well as mechanical c/s txn with elevated plinth      []  See Progress Note/Recertification   Patient will continue to benefit from skilled PT services to modify and progress therapeutic interventions, address functional mobility deficits, address ROM deficits, address strength deficits, analyze and address soft tissue restrictions, analyze and cue movement patterns, analyze and modify body mechanics/ergonomics, assess and modify postural abnormalities and instruct in home and community integration to attain remaining goals.    Progress toward goals / Updated goals:    Progressing towards STG 2 & 4     PLAN  []  Upgrade activities as tolerated YES Continue plan of care   []  Discharge due to :    []  Other:      Therapist: Alyssia Larios PT    Date: 1/18/2022 Time: 4:42 PM     Future Appointments   Date Time Provider Ruslan Kang   1/18/2022  5:15 PM Vaughn Ferguson, PT [facility]   1/20/2022  5:45 PM Ran Sheikh, PT [facility]

## 2022-01-20 ENCOUNTER — HOSPITAL ENCOUNTER (OUTPATIENT)
Dept: PHYSICAL THERAPY | Age: 57
Discharge: HOME | End: 2022-01-20
Payer: COMMERCIAL

## 2022-01-20 PROCEDURE — 97110 THERAPEUTIC EXERCISES: CPT

## 2022-01-20 PROCEDURE — 97012 MECHANICAL TRACTION THERAPY: CPT

## 2022-01-20 NOTE — PROGRESS NOTES
PHYSICAL THERAPY - DAILY TREATMENT NOTE    Patient Name: Anushka Ty        Date: 2022  : 1965   YES Patient  Verified  Visit #:   5   of   12  Insurance: Payor: Tripp Julio / Plan: Claritza Souza RPN / Product Type: Commerical /      In time: 5:45 P Out time: 6:45 P   Total Treatment Time: 50     BCBS/Medicare Time Tracking (below)   Total Timed Codes (min):  NA 1:1 Treatment Time:  NA     TREATMENT AREA =  Neck pain [M54.2]  Right arm weakness [R29.898]    SUBJECTIVE  Pain Level (on 0 to 10 scale):  1-2  / 10   Medication Changes/New allergies or changes in medical history, any new surgeries or procedures?     NO    If yes, update Summary List   Subjective Functional Status/Changes:  []  No changes reported     See progress note to MD            Modalities Rationale:     decrease pain to improve patient's ability to return to pain-free use of R UE   min [] Estim, type/location:                                      []  att     []  unatt     []  w/US     []  w/ice    []  w/heat   15 min [x]  Mechanical Traction: type/lbs 19#                  []  pro   [x]  sup   []  int   [x]  cont    []  before manual    [x]  after manual    min []  Ultrasound, settings/location:      min []  Iontophoresis w/ dexamethasone, location:                                               []  take home patch       []  in clinic   15 min []  Ice     [x]  Heat    location/position: Supine to c/s     min []  Vasopneumatic Device, press/temp:    If using vaso (only need to measure limb vaso being performed on)      pre-treatment girth :       post-treatment girth :       measured at (landmark location) :      min []  Other:    [] Skin assessment post-treatment (if applicable):    [x]  intact    [x]  redness- no adverse reaction                  []redness - adverse reaction:        35 min Therapeutic Exercise:  [x]  See flow sheet   Rationale:      increase ROM and increase strength to improve the patients ability to return to pain-free use or R UE with work activities     Billed With/As:   [] TE   [] TA   [] Neuro   [] Self Care Patient Education: [x] Review HEP    [] Progressed/Changed HEP based on:   [] positioning   [] body mechanics   [] transfers   [] heat/ice application    [] other:      Other Objective/Functional Measures:    FOTO 62     Post Treatment Pain Level (on 0 to 10) scale:   1  / 10     ASSESSMENT  Assessment/Changes in Function:     Slow progress with pain reduction     []  See Progress Note/Recertification   Patient will continue to benefit from skilled PT services to modify and progress therapeutic interventions, address functional mobility deficits, address ROM deficits, address strength deficits, analyze and address soft tissue restrictions, analyze and cue movement patterns, analyze and modify body mechanics/ergonomics, assess and modify postural abnormalities and instruct in home and community integration to attain remaining goals. Progress toward goals / Updated goals:  Progressing towards STG 2 & 3, STG 1 met     PLAN  []  Upgrade activities as tolerated NO Continue plan of care   []  Discharge due to :    []  Other:      Therapist: Kaykay Marcial PT    Date: 1/20/2022 Time: 5:57 PM     No future appointments.

## 2022-01-20 NOTE — PROGRESS NOTES
[address] [place] [address] PHYSICAL THERAPY AT [address] [place] [address] [facility], [address] [place] [address], [address]  Phone: [phone]  Fax: [phone]  PROGRESS NOTE  Patient Name: Jeanie Charles :    Treatment/Medical Diagnosis: Neck pain [M54.2]  Right arm weakness [R29.898]   Referral Source: Mattie Art MD     Date of Initial Visit: 22 Attended Visits: 5 Missed Visits: 0     SUMMARY OF TREATMENT  Therapeutic exercise including ROM, stretching, gentle strengthening,  postural ed, patient education, HEP instruction, MHP, manual therapy including manual c/s traction, STM/MFR, mechanical c/s traction. CURRENT STATUS  Mrs. Papa Whitehead has made minimal progress with PT, she continues to report primary c/o R>L sided UE \"heaviness\", from level of elbow into hands with additional c/o neck/upper arm pain. She reports increased symptoms when her arms are resting or hanging at her side & is now unable to lay fully supine without increase in sx. Unable to tolerate manual therapy & mechanical c/s traction without being positioned on a elevated plinth. Manual/mechanical c/s traction provides some reduction of radicular pain, although this relief is temporary. Goal/Measure of Progress Goal Met? 1.  Establish HEP to prevent further disability. Status at last Eval: NA Current Status: HEP established  yes   2. Patient will report decreased c/o pain to < or = 4-5/10 to facilitate prolonged sitting at workstation with manageable sx in c/s & R UE. Status at last Eval: Average pain 6/10  At worst 6/10  At best 0-1/10 Current Status: Average pain 2/10  At worst 5-6/10  At best 0/10 progressing   3. Patient will be able to demonstrate the appropriate sitting posture with or without use of l/s roll to facilitate sitting activities at home/work. Status at last Eval: NA Current Status: Good un/supported sitting posture yes   4.   Patient will be able to tolerate sleeping uninterrupted > or = 4-5 hours with use of c/s roll & postural ed. Status at last Eval: Limited by pain Current Status: Continues to be limited by pain progressing         RECOMMENDATIONS  Patient to be placed on hold & to continue with home program & home c/s traction unit, f/u with MD on 1-24-22, awaiting MD indication. If you have any questions/comments please contact us directly at [phone]. Thank you for allowing us to assist in the care of your patient. Therapist Signature: MOHAMUD Gonzáles, cert MDT Date: 9/29/8866   Certification Period:  Reporting Period: NA Time: 5:59 PM     NOTE TO PHYSICIAN:  PLEASE COMPLETE THE ORDERS BELOW AND FAX TO   [facility]: [phone]. If you are unable to process this request in 24 hours please contact our office: [phone].    ___ I have read the above report and request that my patient continue as recommended.   ___ I have read the above report and request that my patient continue therapy with the following changes/special instructions:_________________________________________________________   ___ I have read the above report and request that my patient be discharged from therapy.      Physician Signature:                                                             Date:                                     Time:                                                                       Nam Camp MD

## 2022-03-15 NOTE — PROGRESS NOTES
[address] [place] [address] PHYSICAL THERAPY AT [address] [place] [address]  [facility], [address] [place] [address], [address]  Phone: [phone]  Fax: [phone] SUMMARY  Patient Name: Seven Smith :    Treatment/Medical Diagnosis: Neck pain [M54.2]  Right arm weakness [R29.898]   Referral Source: Toño Hunter MD     Date of Initial Visit: 22 Attended Visits: 5 Missed Visits: 0     SUMMARY OF TREATMENT  Therapeutic exercise including ROM, stretching, gentle strengthening,  postural ed, patient education, HEP instruction, MHP, manual therapy including manual c/s traction, STM/MFR, mechanical c/s traction. CURRENT STATUS  Mrs. Todd Hughes was last seen for PT on 22, please see progress note on this date for progress with PT. Spoke with patient on 3-10-22, she had received an epidural injection on 22 & had reported reduction in symptoms, previous c/o R numbness in R arm is now intermittent & very mild. Discussed DC instructions with patient & f/u with MD nair. RECOMMENDATIONS  Discontinue therapy. Progressing towards or have reached established goals. If you have any questions/comments please contact us directly at [phone]. Thank you for allowing us to assist in the care of your patient.     Therapist Signature: MOHAMUD Robles, cert MDT Date:      Time: 8:00 AM

## 2022-12-15 ENCOUNTER — APPOINTMENT (OUTPATIENT)
Dept: PHYSICAL THERAPY | Age: 57
End: 2022-12-15

## 2022-12-20 ENCOUNTER — APPOINTMENT (OUTPATIENT)
Dept: PHYSICAL THERAPY | Age: 57
End: 2022-12-20

## 2022-12-22 ENCOUNTER — APPOINTMENT (OUTPATIENT)
Dept: PHYSICAL THERAPY | Age: 57
End: 2022-12-22

## 2022-12-27 ENCOUNTER — APPOINTMENT (OUTPATIENT)
Dept: PHYSICAL THERAPY | Age: 57
End: 2022-12-27